# Patient Record
Sex: MALE | Race: WHITE | NOT HISPANIC OR LATINO | Employment: OTHER | URBAN - METROPOLITAN AREA
[De-identification: names, ages, dates, MRNs, and addresses within clinical notes are randomized per-mention and may not be internally consistent; named-entity substitution may affect disease eponyms.]

---

## 2017-08-25 ENCOUNTER — GENERIC CONVERSION - ENCOUNTER (OUTPATIENT)
Dept: OTHER | Facility: OTHER | Age: 77
End: 2017-08-25

## 2017-09-19 DIAGNOSIS — C61 MALIGNANT NEOPLASM OF PROSTATE (HCC): ICD-10-CM

## 2017-09-21 ENCOUNTER — ALLSCRIPTS OFFICE VISIT (OUTPATIENT)
Dept: OTHER | Facility: OTHER | Age: 77
End: 2017-09-21

## 2017-09-21 LAB
BILIRUB UR QL STRIP: NORMAL
CLARITY UR: NORMAL
COLOR UR: YELLOW
GLUCOSE (HISTORICAL): NORMAL
HGB UR QL STRIP.AUTO: NORMAL
KETONES UR STRIP-MCNC: NORMAL MG/DL
LEUKOCYTE ESTERASE UR QL STRIP: NORMAL
NITRITE UR QL STRIP: NORMAL
PH UR STRIP.AUTO: 6 [PH]
PROT UR STRIP-MCNC: NORMAL MG/DL
SP GR UR STRIP.AUTO: 1.02
UROBILINOGEN UR QL STRIP.AUTO: 0.2

## 2017-10-30 ENCOUNTER — ANESTHESIA EVENT (OUTPATIENT)
Dept: PERIOP | Facility: HOSPITAL | Age: 77
DRG: 470 | End: 2017-10-30
Payer: MEDICARE

## 2017-10-30 RX ORDER — SODIUM CHLORIDE 9 MG/ML
125 INJECTION, SOLUTION INTRAVENOUS CONTINUOUS
Status: CANCELLED | OUTPATIENT
Start: 2017-10-30

## 2017-10-31 ENCOUNTER — HOSPITAL ENCOUNTER (OUTPATIENT)
Dept: RADIOLOGY | Facility: HOSPITAL | Age: 77
Discharge: HOME | End: 2017-10-31
Attending: ORTHOPAEDIC SURGERY
Payer: MEDICARE

## 2017-10-31 ENCOUNTER — APPOINTMENT (OUTPATIENT)
Dept: PREADMISSION TESTING | Facility: HOSPITAL | Age: 77
End: 2017-10-31
Payer: MEDICARE

## 2017-10-31 ENCOUNTER — HOSPITAL ENCOUNTER (OUTPATIENT)
Dept: NON INVASIVE DIAGNOSTICS | Facility: HOSPITAL | Age: 77
Discharge: HOME | End: 2017-10-31
Attending: ORTHOPAEDIC SURGERY
Payer: MEDICARE

## 2017-10-31 ENCOUNTER — TRANSCRIBE ORDERS (OUTPATIENT)
Dept: ADMINISTRATIVE | Facility: HOSPITAL | Age: 77
End: 2017-10-31

## 2017-10-31 ENCOUNTER — APPOINTMENT (OUTPATIENT)
Dept: LAB | Facility: HOSPITAL | Age: 77
End: 2017-10-31
Attending: ORTHOPAEDIC SURGERY
Payer: MEDICARE

## 2017-10-31 VITALS
SYSTOLIC BLOOD PRESSURE: 122 MMHG | RESPIRATION RATE: 16 BRPM | TEMPERATURE: 97.6 F | HEART RATE: 68 BPM | DIASTOLIC BLOOD PRESSURE: 70 MMHG | HEIGHT: 72 IN | BODY MASS INDEX: 26.14 KG/M2 | WEIGHT: 193 LBS

## 2017-10-31 DIAGNOSIS — Z01.818 PREOP EXAMINATION: ICD-10-CM

## 2017-10-31 DIAGNOSIS — M16.11 PRIMARY OSTEOARTHRITIS OF RIGHT HIP: ICD-10-CM

## 2017-10-31 DIAGNOSIS — Z01.818 PREOP EXAMINATION: Primary | ICD-10-CM

## 2017-10-31 LAB
ALBUMIN SERPL BCP-MCNC: 3.8 G/DL (ref 3.5–5)
ALP SERPL-CCNC: 100 U/L (ref 46–116)
ALT SERPL W P-5'-P-CCNC: 23 U/L (ref 12–78)
ANION GAP SERPL CALCULATED.3IONS-SCNC: 6 MMOL/L (ref 4–13)
AST SERPL W P-5'-P-CCNC: 17 U/L (ref 5–45)
ATRIAL RATE: 79 BPM
BASOPHILS # BLD AUTO: 0.02 THOUSANDS/ΜL (ref 0–0.1)
BASOPHILS NFR BLD AUTO: 0 % (ref 0–1)
BILIRUB SERPL-MCNC: 0.43 MG/DL (ref 0.2–1)
BILIRUB UR QL STRIP: NEGATIVE
BUN SERPL-MCNC: 11 MG/DL (ref 5–25)
CALCIUM SERPL-MCNC: 9.5 MG/DL (ref 8.3–10.1)
CHLORIDE SERPL-SCNC: 102 MMOL/L (ref 100–108)
CLARITY UR: CLEAR
CO2 SERPL-SCNC: 31 MMOL/L (ref 21–32)
COLOR UR: YELLOW
CREAT SERPL-MCNC: 0.84 MG/DL (ref 0.6–1.3)
EOSINOPHIL # BLD AUTO: 0.23 THOUSAND/ΜL (ref 0–0.61)
EOSINOPHIL NFR BLD AUTO: 4 % (ref 0–6)
ERYTHROCYTE [DISTWIDTH] IN BLOOD BY AUTOMATED COUNT: 13.1 % (ref 11.6–15.1)
GFR SERPL CREATININE-BSD FRML MDRD: 85 ML/MIN/1.73SQ M
GLUCOSE SERPL-MCNC: 92 MG/DL (ref 65–140)
GLUCOSE UR STRIP-MCNC: NEGATIVE MG/DL
HCT VFR BLD AUTO: 36.8 % (ref 36.5–49.3)
HGB BLD-MCNC: 12.4 G/DL (ref 12–17)
HGB UR QL STRIP.AUTO: NEGATIVE
INR PPP: 1.25 (ref 0.86–1.16)
KETONES UR STRIP-MCNC: NEGATIVE MG/DL
LEUKOCYTE ESTERASE UR QL STRIP: NEGATIVE
LYMPHOCYTES # BLD AUTO: 1.02 THOUSANDS/ΜL (ref 0.6–4.47)
LYMPHOCYTES NFR BLD AUTO: 17 % (ref 14–44)
MCH RBC QN AUTO: 30.8 PG (ref 26.8–34.3)
MCHC RBC AUTO-ENTMCNC: 33.7 G/DL (ref 31.4–37.4)
MCV RBC AUTO: 91 FL (ref 82–98)
MONOCYTES # BLD AUTO: 0.57 THOUSAND/ΜL (ref 0.17–1.22)
MONOCYTES NFR BLD AUTO: 10 % (ref 4–12)
NEUTROPHILS # BLD AUTO: 4.11 THOUSANDS/ΜL (ref 1.85–7.62)
NEUTS SEG NFR BLD AUTO: 69 % (ref 43–75)
NITRITE UR QL STRIP: NEGATIVE
NRBC BLD AUTO-RTO: 0 /100 WBCS
PH UR STRIP.AUTO: 6.5 [PH] (ref 4.5–8)
PLATELET # BLD AUTO: 262 THOUSANDS/UL (ref 149–390)
PMV BLD AUTO: 9.8 FL (ref 8.9–12.7)
POTASSIUM SERPL-SCNC: 4.1 MMOL/L (ref 3.5–5.3)
PROT SERPL-MCNC: 7.5 G/DL (ref 6.4–8.2)
PROT UR STRIP-MCNC: NEGATIVE MG/DL
PROTHROMBIN TIME: 15.8 SECONDS (ref 12.1–14.4)
QRS AXIS: 15 DEGREES
QRSD INTERVAL: 78 MS
QT INTERVAL: 406 MS
QTC INTERVAL: 431 MS
RBC # BLD AUTO: 4.03 MILLION/UL (ref 3.88–5.62)
SODIUM SERPL-SCNC: 139 MMOL/L (ref 136–145)
SP GR UR STRIP.AUTO: 1.01 (ref 1–1.03)
T WAVE AXIS: 12 DEGREES
UROBILINOGEN UR QL STRIP.AUTO: 0.2 E.U./DL
VENTRICULAR RATE: 68 BPM
WBC # BLD AUTO: 5.95 THOUSAND/UL (ref 4.31–10.16)

## 2017-10-31 PROCEDURE — 93005 ELECTROCARDIOGRAM TRACING: CPT

## 2017-10-31 PROCEDURE — 36415 COLL VENOUS BLD VENIPUNCTURE: CPT

## 2017-10-31 PROCEDURE — 85025 COMPLETE CBC W/AUTO DIFF WBC: CPT

## 2017-10-31 PROCEDURE — 71020 HB CHEST X-RAY 2VW FRONTAL&LATL: CPT

## 2017-10-31 PROCEDURE — 80053 COMPREHEN METABOLIC PANEL: CPT

## 2017-10-31 PROCEDURE — 85610 PROTHROMBIN TIME: CPT

## 2017-10-31 PROCEDURE — 81003 URINALYSIS AUTO W/O SCOPE: CPT

## 2017-10-31 RX ORDER — DILTIAZEM HYDROCHLORIDE 300 MG/1
300 CAPSULE, EXTENDED RELEASE ORAL DAILY
COMMUNITY
End: 2017-12-01 | Stop reason: HOSPADM

## 2017-10-31 RX ORDER — ATORVASTATIN CALCIUM 20 MG/1
20 TABLET, FILM COATED ORAL 3 TIMES WEEKLY
COMMUNITY

## 2017-10-31 RX ORDER — MULTIVIT-MIN/IRON FUM/FOLIC AC 7.5 MG-4
1 TABLET ORAL DAILY
COMMUNITY
End: 2020-02-22 | Stop reason: HOSPADM

## 2017-10-31 NOTE — PRE-PROCEDURE INSTRUCTIONS
Pre-Surgery Instructions:   Medication Instructions    apixaban (ELIQUIS) 5 mg Patient was instructed by Physician and understands   atorvastatin (LIPITOR) 20 mg tablet Patient was instructed by Physician and understands   diltiazem (TIAZAC) 300 MG 24 hr capsule Patient was instructed by Physician and understands   Leuprolide Acetate (LUPRON SC) Patient was instructed by Physician and understands   Multiple Vitamins-Minerals (MULTIVITAMIN WITH MINERALS) tablet Patient was instructed by Physician and understands   Omega-3 Fatty Acids (FISH OIL PO) Patient was instructed by Physician and understands   TRAMADOL HCL PO Patient was instructed by Physician and understands  Pt instructed by Sergio to take no meds the morning of surgery  Pt and spouse given/reviewed [facility] preop instructions and he has  chlorhexadine soap   Pt to hold asa/NSAIDS/vitamins/herbal supplements one week before surgery or as per Dr Nazia Hernandez

## 2017-10-31 NOTE — ANESTHESIA PREPROCEDURE EVALUATION
Review of Systems/Medical History  Patient summary reviewed  Chart reviewed      Cardiovascular  Hyperlipidemia, Dysrhythmias (controlled VR), atrial fibrillation,    Pulmonary       GI/Hepatic            Endo/Other  Arthritis     GYN       Hematology    Coagulation disorder (Eliquis--to see Cardiology) currently taking oral anticoagulants,    Musculoskeletal  Negative musculoskeletal ROS        Neurology  Negative neurology ROS      Psychology   Negative psychology ROS            Physical Exam    Airway    Mallampati score: II  TM Distance: >3 FB  Neck ROM: full     Dental   No notable dental hx implants,     Cardiovascular  Rhythm: irregular, Rate: normal, Cardiovascular exam normal    Pulmonary  Pulmonary exam normal Breath sounds clear to auscultation,     Other Findings        Anesthesia Plan  ASA Score- 2       Anesthesia Type- spinal with ASA Monitors  Additional Monitors:   Airway Plan:           Induction- intravenous  Informed Consent- Anesthetic plan and risks discussed with patient and spouse

## 2017-10-31 NOTE — ANESTHESIA PREPROCEDURE EVALUATION
Review of Systems/Medical History  Patient summary reviewed  Chart reviewed      Cardiovascular  Hyperlipidemia, Dysrhythmias (controlled VR), atrial fibrillation,    Pulmonary       GI/Hepatic            Endo/Other  Arthritis     GYN       Hematology    Coagulation disorder (Eliquis--to see Cardiology) currently taking oral anticoagulants,    Musculoskeletal  Negative musculoskeletal ROS        Neurology  Negative neurology ROS      Psychology   Negative psychology ROS            Physical Exam    Airway    Mallampati score: II  TM Distance: >3 FB  Neck ROM: full     Dental   No notable dental hx implants,     Cardiovascular  Rhythm: irregular, Rate: normal, Cardiovascular exam normal    Pulmonary  Pulmonary exam normal Breath sounds clear to auscultation,     Other Findings        Anesthesia Plan  ASA Score- 3       Anesthesia Type- spinal with ASA Monitors  Additional Monitors:   Airway Plan:           Induction- intravenous  Informed Consent- Anesthetic plan and risks discussed with patient  I personally reviewed this patient with the CRNA  Discussed and agreed on the Anesthesia Plan with the CRNA  Jocelynn Amador

## 2017-11-29 ENCOUNTER — APPOINTMENT (OUTPATIENT)
Dept: RADIOLOGY | Facility: HOSPITAL | Age: 77
DRG: 470 | End: 2017-11-29
Payer: MEDICARE

## 2017-11-29 ENCOUNTER — ANESTHESIA (OUTPATIENT)
Dept: PERIOP | Facility: HOSPITAL | Age: 77
DRG: 470 | End: 2017-11-29
Payer: MEDICARE

## 2017-11-29 ENCOUNTER — HOSPITAL ENCOUNTER (INPATIENT)
Facility: HOSPITAL | Age: 77
LOS: 2 days | Discharge: HOME HEALTH | DRG: 470 | End: 2017-12-01
Attending: ORTHOPAEDIC SURGERY | Admitting: ORTHOPAEDIC SURGERY
Payer: MEDICARE

## 2017-11-29 ENCOUNTER — APPOINTMENT (INPATIENT)
Dept: RADIOLOGY | Facility: HOSPITAL | Age: 77
DRG: 470 | End: 2017-11-29
Payer: MEDICARE

## 2017-11-29 DIAGNOSIS — I48.91 ATRIAL FIBRILLATION AND FLUTTER (HCC): ICD-10-CM

## 2017-11-29 DIAGNOSIS — M16.11 PRIMARY OSTEOARTHRITIS OF RIGHT HIP: ICD-10-CM

## 2017-11-29 DIAGNOSIS — I49.5 TACHYCARDIA-BRADYCARDIA (HCC): Primary | ICD-10-CM

## 2017-11-29 DIAGNOSIS — I48.92 ATRIAL FIBRILLATION AND FLUTTER (HCC): ICD-10-CM

## 2017-11-29 LAB
ABO GROUP BLD: NORMAL
BLD GP AB SCN SERPL QL: NEGATIVE
INR PPP: 1.07 (ref 0.86–1.16)
PROTHROMBIN TIME: 13.9 SECONDS (ref 12.1–14.4)
RH BLD: POSITIVE
SPECIMEN EXPIRATION DATE: NORMAL

## 2017-11-29 PROCEDURE — 85610 PROTHROMBIN TIME: CPT | Performed by: ORTHOPAEDIC SURGERY

## 2017-11-29 PROCEDURE — C1713 ANCHOR/SCREW BN/BN,TIS/BN: HCPCS | Performed by: ORTHOPAEDIC SURGERY

## 2017-11-29 PROCEDURE — 86901 BLOOD TYPING SEROLOGIC RH(D): CPT | Performed by: ORTHOPAEDIC SURGERY

## 2017-11-29 PROCEDURE — C1776 JOINT DEVICE (IMPLANTABLE): HCPCS | Performed by: ORTHOPAEDIC SURGERY

## 2017-11-29 PROCEDURE — 94762 N-INVAS EAR/PLS OXIMTRY CONT: CPT

## 2017-11-29 PROCEDURE — 86900 BLOOD TYPING SEROLOGIC ABO: CPT | Performed by: ORTHOPAEDIC SURGERY

## 2017-11-29 PROCEDURE — 86850 RBC ANTIBODY SCREEN: CPT | Performed by: ORTHOPAEDIC SURGERY

## 2017-11-29 PROCEDURE — G8978 MOBILITY CURRENT STATUS: HCPCS

## 2017-11-29 PROCEDURE — 73501 X-RAY EXAM HIP UNI 1 VIEW: CPT

## 2017-11-29 PROCEDURE — G8979 MOBILITY GOAL STATUS: HCPCS

## 2017-11-29 PROCEDURE — 97163 PT EVAL HIGH COMPLEX 45 MIN: CPT

## 2017-11-29 DEVICE — PINNACLE HIP SOLUTIONS ALTRX POLYETHYLENE ACETABULAR LINER NEUTRAL 36MM ID 54MM OD
Type: IMPLANTABLE DEVICE | Site: HIP | Status: FUNCTIONAL
Brand: PINNACLE ALTRX

## 2017-11-29 DEVICE — PINNACLE CANCELLOUS BONE SCREW 6.5MM X 20MM
Type: IMPLANTABLE DEVICE | Site: HIP | Status: FUNCTIONAL
Brand: PINNACLE

## 2017-11-29 DEVICE — PINNACLE POROCOAT ACETABULAR SHELL SECTOR II 54MM OD
Type: IMPLANTABLE DEVICE | Site: HIP | Status: FUNCTIONAL
Brand: PINNACLE POROCOAT

## 2017-11-29 DEVICE — CORAIL HIP SYSTEM CEMENTLESS FEMORAL STEM 12/14 AMT 125 DEGREES KLA SIZE 11 HA COATED HIGH OFFSET COLLAR
Type: IMPLANTABLE DEVICE | Site: HIP | Status: FUNCTIONAL
Brand: CORAIL

## 2017-11-29 DEVICE — BIOLOX DELTA CERAMIC FEMORAL HEAD +5.0 36MM DIA 12/14 TAPER
Type: IMPLANTABLE DEVICE | Site: HIP | Status: FUNCTIONAL
Brand: BIOLOX DELTA

## 2017-11-29 RX ORDER — PANTOPRAZOLE SODIUM 40 MG/1
40 TABLET, DELAYED RELEASE ORAL
Status: DISCONTINUED | OUTPATIENT
Start: 2017-11-29 | End: 2017-12-01 | Stop reason: HOSPADM

## 2017-11-29 RX ORDER — FENTANYL CITRATE/PF 50 MCG/ML
50 SYRINGE (ML) INJECTION
Status: DISCONTINUED | OUTPATIENT
Start: 2017-11-29 | End: 2017-11-29 | Stop reason: HOSPADM

## 2017-11-29 RX ORDER — BUPIVACAINE HYDROCHLORIDE 7.5 MG/ML
INJECTION, SOLUTION INTRASPINAL AS NEEDED
Status: DISCONTINUED | OUTPATIENT
Start: 2017-11-29 | End: 2017-11-29 | Stop reason: SURG

## 2017-11-29 RX ORDER — MEPERIDINE HYDROCHLORIDE 50 MG/ML
12.5 INJECTION INTRAMUSCULAR; INTRAVENOUS; SUBCUTANEOUS ONCE AS NEEDED
Status: DISCONTINUED | OUTPATIENT
Start: 2017-11-29 | End: 2017-11-29 | Stop reason: HOSPADM

## 2017-11-29 RX ORDER — MIDAZOLAM HYDROCHLORIDE 1 MG/ML
INJECTION INTRAMUSCULAR; INTRAVENOUS AS NEEDED
Status: DISCONTINUED | OUTPATIENT
Start: 2017-11-29 | End: 2017-11-29 | Stop reason: SURG

## 2017-11-29 RX ORDER — ACETAMINOPHEN 325 MG/1
650 TABLET ORAL EVERY 6 HOURS PRN
Status: DISCONTINUED | OUTPATIENT
Start: 2017-11-29 | End: 2017-12-01 | Stop reason: HOSPADM

## 2017-11-29 RX ORDER — KETOROLAC TROMETHAMINE 30 MG/ML
15 INJECTION, SOLUTION INTRAMUSCULAR; INTRAVENOUS EVERY 6 HOURS PRN
Status: DISPENSED | OUTPATIENT
Start: 2017-11-29 | End: 2017-11-30

## 2017-11-29 RX ORDER — ONDANSETRON 2 MG/ML
4 INJECTION INTRAMUSCULAR; INTRAVENOUS EVERY 6 HOURS PRN
Status: DISCONTINUED | OUTPATIENT
Start: 2017-11-30 | End: 2017-12-01

## 2017-11-29 RX ORDER — ONDANSETRON 2 MG/ML
4 INJECTION INTRAMUSCULAR; INTRAVENOUS EVERY 4 HOURS PRN
Status: ACTIVE | OUTPATIENT
Start: 2017-11-29 | End: 2017-11-30

## 2017-11-29 RX ORDER — CELECOXIB 200 MG/1
200 CAPSULE ORAL DAILY
Status: DISCONTINUED | OUTPATIENT
Start: 2017-11-29 | End: 2017-12-01 | Stop reason: HOSPADM

## 2017-11-29 RX ORDER — SODIUM CHLORIDE, SODIUM LACTATE, POTASSIUM CHLORIDE, CALCIUM CHLORIDE 600; 310; 30; 20 MG/100ML; MG/100ML; MG/100ML; MG/100ML
75 INJECTION, SOLUTION INTRAVENOUS CONTINUOUS
Status: DISCONTINUED | OUTPATIENT
Start: 2017-11-29 | End: 2017-12-01

## 2017-11-29 RX ORDER — VANCOMYCIN HYDROCHLORIDE 1 G/200ML
1000 INJECTION, SOLUTION INTRAVENOUS ONCE
Status: COMPLETED | OUTPATIENT
Start: 2017-11-29 | End: 2017-11-29

## 2017-11-29 RX ORDER — DEXAMETHASONE SODIUM PHOSPHATE 4 MG/ML
4 INJECTION, SOLUTION INTRA-ARTICULAR; INTRALESIONAL; INTRAMUSCULAR; INTRAVENOUS; SOFT TISSUE ONCE AS NEEDED
Status: ACTIVE | OUTPATIENT
Start: 2017-11-29 | End: 2017-11-30

## 2017-11-29 RX ORDER — SODIUM CHLORIDE 9 MG/ML
125 INJECTION, SOLUTION INTRAVENOUS CONTINUOUS
Status: DISCONTINUED | OUTPATIENT
Start: 2017-11-29 | End: 2017-11-30

## 2017-11-29 RX ORDER — PROPOFOL 10 MG/ML
INJECTION, EMULSION INTRAVENOUS CONTINUOUS PRN
Status: DISCONTINUED | OUTPATIENT
Start: 2017-11-29 | End: 2017-11-29 | Stop reason: SURG

## 2017-11-29 RX ORDER — NALBUPHINE HCL 10 MG/ML
5 AMPUL (ML) INJECTION
Status: ACTIVE | OUTPATIENT
Start: 2017-11-29 | End: 2017-11-30

## 2017-11-29 RX ORDER — ONDANSETRON 2 MG/ML
4 INJECTION INTRAMUSCULAR; INTRAVENOUS ONCE AS NEEDED
Status: DISCONTINUED | OUTPATIENT
Start: 2017-11-29 | End: 2017-11-29 | Stop reason: HOSPADM

## 2017-11-29 RX ORDER — DILTIAZEM HYDROCHLORIDE 300 MG/1
300 CAPSULE, COATED, EXTENDED RELEASE ORAL DAILY
Status: DISCONTINUED | OUTPATIENT
Start: 2017-11-29 | End: 2017-11-30

## 2017-11-29 RX ORDER — METHOCARBAMOL 500 MG/1
500 TABLET, FILM COATED ORAL EVERY 6 HOURS PRN
Status: DISCONTINUED | OUTPATIENT
Start: 2017-11-29 | End: 2017-12-01 | Stop reason: HOSPADM

## 2017-11-29 RX ORDER — MAGNESIUM HYDROXIDE 1200 MG/15ML
LIQUID ORAL AS NEEDED
Status: DISCONTINUED | OUTPATIENT
Start: 2017-11-29 | End: 2017-11-29 | Stop reason: HOSPADM

## 2017-11-29 RX ORDER — DIPHENHYDRAMINE HYDROCHLORIDE 50 MG/ML
25 INJECTION INTRAMUSCULAR; INTRAVENOUS EVERY 6 HOURS PRN
Status: DISCONTINUED | OUTPATIENT
Start: 2017-11-29 | End: 2017-11-30

## 2017-11-29 RX ORDER — METOCLOPRAMIDE HYDROCHLORIDE 5 MG/ML
5 INJECTION INTRAMUSCULAR; INTRAVENOUS EVERY 6 HOURS PRN
Status: DISCONTINUED | OUTPATIENT
Start: 2017-11-29 | End: 2017-11-30

## 2017-11-29 RX ORDER — OXYCODONE HYDROCHLORIDE 5 MG/1
5 TABLET ORAL EVERY 4 HOURS PRN
Status: DISCONTINUED | OUTPATIENT
Start: 2017-11-30 | End: 2017-12-01 | Stop reason: HOSPADM

## 2017-11-29 RX ORDER — OXYCODONE HYDROCHLORIDE 10 MG/1
10 TABLET ORAL EVERY 4 HOURS PRN
Status: DISCONTINUED | OUTPATIENT
Start: 2017-11-30 | End: 2017-12-01 | Stop reason: HOSPADM

## 2017-11-29 RX ORDER — SENNOSIDES 8.6 MG
1 TABLET ORAL DAILY
Status: DISCONTINUED | OUTPATIENT
Start: 2017-11-29 | End: 2017-12-01 | Stop reason: HOSPADM

## 2017-11-29 RX ORDER — CALCIUM CARBONATE 200(500)MG
1000 TABLET,CHEWABLE ORAL DAILY PRN
Status: DISCONTINUED | OUTPATIENT
Start: 2017-11-29 | End: 2017-12-01 | Stop reason: HOSPADM

## 2017-11-29 RX ORDER — FENTANYL CITRATE 50 UG/ML
INJECTION, SOLUTION INTRAMUSCULAR; INTRAVENOUS AS NEEDED
Status: DISCONTINUED | OUTPATIENT
Start: 2017-11-29 | End: 2017-11-29 | Stop reason: SURG

## 2017-11-29 RX ORDER — MORPHINE SULFATE 0.5 MG/ML
INJECTION, SOLUTION EPIDURAL; INTRATHECAL; INTRAVENOUS AS NEEDED
Status: DISCONTINUED | OUTPATIENT
Start: 2017-11-29 | End: 2017-11-29 | Stop reason: SURG

## 2017-11-29 RX ORDER — TRANEXAMIC ACID 100 MG/ML
INJECTION, SOLUTION INTRAVENOUS AS NEEDED
Status: DISCONTINUED | OUTPATIENT
Start: 2017-11-29 | End: 2017-11-29 | Stop reason: SURG

## 2017-11-29 RX ORDER — TRAMADOL HYDROCHLORIDE 50 MG/1
50 TABLET ORAL EVERY 6 HOURS PRN
Status: DISCONTINUED | OUTPATIENT
Start: 2017-11-30 | End: 2017-12-01 | Stop reason: HOSPADM

## 2017-11-29 RX ORDER — ATORVASTATIN CALCIUM 20 MG/1
20 TABLET, FILM COATED ORAL
Status: DISCONTINUED | OUTPATIENT
Start: 2017-11-29 | End: 2017-12-01 | Stop reason: HOSPADM

## 2017-11-29 RX ORDER — DILTIAZEM HYDROCHLORIDE 300 MG/1
300 CAPSULE, EXTENDED RELEASE ORAL DAILY
Status: DISCONTINUED | OUTPATIENT
Start: 2017-11-29 | End: 2017-11-29 | Stop reason: SDUPTHER

## 2017-11-29 RX ORDER — ONDANSETRON 2 MG/ML
INJECTION INTRAMUSCULAR; INTRAVENOUS AS NEEDED
Status: DISCONTINUED | OUTPATIENT
Start: 2017-11-29 | End: 2017-11-29 | Stop reason: SURG

## 2017-11-29 RX ADMIN — SODIUM CHLORIDE, SODIUM LACTATE, POTASSIUM CHLORIDE, AND CALCIUM CHLORIDE 1000 ML: .6; .31; .03; .02 INJECTION, SOLUTION INTRAVENOUS at 09:35

## 2017-11-29 RX ADMIN — APIXABAN 2.5 MG: 2.5 TABLET, FILM COATED ORAL at 21:32

## 2017-11-29 RX ADMIN — ONDANSETRON HYDROCHLORIDE 4 MG: 2 INJECTION, SOLUTION INTRAVENOUS at 09:14

## 2017-11-29 RX ADMIN — SODIUM CHLORIDE: 0.9 INJECTION, SOLUTION INTRAVENOUS at 08:51

## 2017-11-29 RX ADMIN — MIDAZOLAM HYDROCHLORIDE 2 MG: 1 INJECTION, SOLUTION INTRAMUSCULAR; INTRAVENOUS at 07:28

## 2017-11-29 RX ADMIN — SODIUM CHLORIDE 125 ML/HR: 0.9 INJECTION, SOLUTION INTRAVENOUS at 06:47

## 2017-11-29 RX ADMIN — VANCOMYCIN HYDROCHLORIDE 1000 MG: 1 INJECTION, SOLUTION INTRAVENOUS at 07:48

## 2017-11-29 RX ADMIN — TRANEXAMIC ACID 1000 MG: 100 INJECTION, SOLUTION INTRAVENOUS at 07:56

## 2017-11-29 RX ADMIN — ATORVASTATIN CALCIUM 20 MG: 20 TABLET, FILM COATED ORAL at 15:28

## 2017-11-29 RX ADMIN — MORPHINE SULFATE 0.15 MG: 0.5 INJECTION, SOLUTION EPIDURAL; INTRATHECAL; INTRAVENOUS at 07:37

## 2017-11-29 RX ADMIN — PROPOFOL 100 MCG/KG/MIN: 10 INJECTION, EMULSION INTRAVENOUS at 07:38

## 2017-11-29 RX ADMIN — BUPIVACAINE HYDROCHLORIDE IN DEXTROSE 1.8 ML: 7.5 INJECTION, SOLUTION SUBARACHNOID at 07:37

## 2017-11-29 RX ADMIN — DILTIAZEM HYDROCHLORIDE 300 MG: 300 CAPSULE, COATED, EXTENDED RELEASE ORAL at 19:09

## 2017-11-29 RX ADMIN — CEFAZOLIN SODIUM 2000 MG: 2 SOLUTION INTRAVENOUS at 07:28

## 2017-11-29 RX ADMIN — SODIUM CHLORIDE, SODIUM LACTATE, POTASSIUM CHLORIDE, AND CALCIUM CHLORIDE 125 ML/HR: 600; 310; 30; 20 INJECTION, SOLUTION INTRAVENOUS at 17:47

## 2017-11-29 RX ADMIN — SODIUM CHLORIDE, SODIUM LACTATE, POTASSIUM CHLORIDE, AND CALCIUM CHLORIDE 125 ML/HR: 600; 310; 30; 20 INJECTION, SOLUTION INTRAVENOUS at 10:28

## 2017-11-29 RX ADMIN — CEFAZOLIN SODIUM 1000 MG: 1 SOLUTION INTRAVENOUS at 15:28

## 2017-11-29 RX ADMIN — FENTANYL CITRATE 100 MCG: 50 INJECTION INTRAMUSCULAR; INTRAVENOUS at 07:32

## 2017-11-29 NOTE — DISCHARGE INSTRUCTIONS
[facility]   Post Operative Joint Replacement Instructions   Dr Tino Morris Office [address] [phone]     Remove dressing post op day 7  Home Health care will remove the staples/sutures post op day 14  MEDICATIONS     *  You will be asked to take your Eliquis at 2 5 mg twice daily for a total of 5 days post operatively and then resume your home dose of 5 mg twice daily beginning the 6th day  * Iron: Continue the iron supplement twice daily  If you experience nausea or constipation with the medicine discontinue its use  Contact us if the nausea persists  * Pain Medications: Your prescriptions may run out before you return for your next visit  Please give us two regular office day's notice before you run out, to prevent any problems of not having pain medicine  Be advised that prescriptions for Percocet and OxyContin cannot be phoned to your pharmacy  They will need to be picked up at our office  Please refrain from using alcohol with your pain medicines  Percocet contains 325 mg of Tylenol  You should not exceed 3000 mg of Tylenol in a day  Please be careful to not overdose the Tylenol  * Herbal Medicines: Please hold all these preparations until after your first post-operative visit  Unless specifically directed otherwise  ACTIVITY   * Your weight bearing status is: as tolerated     * If you have been furnished with an assistive device  Please feel free to try to wean from using it under the supervision of your therapist      * You may participate in activity as tolerated  It is likely that you will tire more easily for a time after surgery  Please rest when you need it to help your healing process  * Stairs are not restricted for you  Please climb stairs as instructed by your physical therapist      * For hip and knee replacement patients please elevate your leg or legs while resting  This is important to prevent lower leg swelling       * When you are back at home you will likely have physical therapy three times a week  On the days you do not have formal therapy please perform the home exercises given to you  * If you had a hip replacement, please follow the safety precautions given to you by the nurse or therapist taking care of you  * Immediately following the surgery you are not permitted to drive until cleared by your surgeon  This typically does not happen until your first visit after surgery  * Knee replacement patients may be passengers in a vehicle following their discharge from the hospital      * Hip replacement patients are not allowed in a vehicle, except for your trip home and your first follow up visit  Please follow these guidelines unless specifically instructed to start outpatient therapy at an earlier time  * Please do not perform lifting tasks or strenuous domestic activities  * If you currently are employed, you are off work until cleared by your surgeon  Everyone's ability to return to work is determined by his or her abilities  Your return to work may take a few weeks to a few months  * Sexual activities are permitted as tolerated  NORMAL FINDINGS   * Numbness along the incision     * Mechanical click of a knee replacement  * Your incision area will feel warm  WOUND CARE   * It is OK to shower once there is no spotting or drainage for a full 24 hours  Please do not submerge the incision into a pool, bath or hot tub until after your 1st post-operative visit  * Please do not disturb your staples, sutures, or the scabs  It promotes better healing and smaller scars  * Cover the incision with gauze dressing until there is no further drainage  * You may leave the incision open to the air when there is no discharge left on the gauze at changing time  Once this occurs you may shower  * Please be generous with the use of ice  It is a very good remedy   Apply ice for only twenty minutes at a time  You may use it every hour  It is recommended to ice before and after anticipated activities, which includes exercise and physical therapy  * Wear your knee-high pressure stockings every day  They may be removed for sleep at night  Continue to wear them until you are seen for your first follow up  * For knee and hip replacements; your staples will be removed about 14 days after your surgery date  Home nurses and physical therapists typically remove them  If they are unable to, please call our office to have us do it for you  FOLLOW UP   * You should have a scheduled visit with your surgeon scheduled for three to four weeks after surgery  If you do not remember your appointment date and time, or if you are sure you do not have one, please call to set one up  * Please inform the office if you experience one of the following:    - Fever that is not responding to Tylenol and is above 101 degrees    - Redness of the skin that is increasing in area    - Your incision is soaking the dressings with drainage or blood    - You're unable to bear weight on your operative leg or legs

## 2017-11-29 NOTE — ANESTHESIA PROCEDURE NOTES
Spinal Block    Patient location during procedure: OR  Start time: 11/29/2017 7:37 AM  End time: 11/29/2017 7:40 AM  Reason for block: procedure for pain, at surgeon's request, post-op pain management and primary anesthetic  Staffing  Anesthesiologist: Maisha Harry  Performed: anesthesiologist   Preanesthetic Checklist  Completed: patient identified, site marked, surgical consent, pre-op evaluation, timeout performed, IV checked, risks and benefits discussed and monitors and equipment checked  Spinal Block  Patient position: sitting  Prep: Betadine  Patient monitoring: cardiac monitor and frequent blood pressure checks  Approach: midline  Location: L3-4  Injection technique: single-shot  Needle  Needle type: pencil-tip   Needle gauge: 25 G  Needle length: 10 cm  Assessment  Sensory level: [id number] Assessment:  negative aspiration for heme, no paresthesia on injection and positive aspiration for clear CSF    Post-procedure:  adhesive bandage applied, pressure dressing applied, secured with tape, site cleaned and sterile dressing applied

## 2017-11-29 NOTE — PLAN OF CARE
Problem: PHYSICAL THERAPY ADULT  Goal: Performs mobility at highest level of function for planned discharge setting  See evaluation for individualized goals  Treatment/Interventions: Functional transfer training, LE strengthening/ROM, Elevations, Therapeutic exercise, Endurance training, Patient/family training, Bed mobility, Gait training, Spoke to nursing, Family  Equipment Recommended: Steph Odonnell, Other (Comment) ([facility])       See flowsheet documentation for full assessment, interventions and recommendations  Outcome: Progressing  Prognosis: Good  Problem List: Decreased strength, Decreased range of motion, Decreased endurance, Impaired balance, Decreased mobility, Pain  Assessment: Pt  77 y o male s/p R THR (anterior) 2* to severe DJD  Pt referred to PT for mobility assessment & D/C planning  PTA, pt reports being I w/o AD  On eval, pt demonstrate dec mobility, balance, endurance & amb 2* to RLE weakness  Pt require minAx1 for most functional mobility + cues for techniques  Gait deviations as above, slow & antalgic but no gross LOB noted  No dizziness reported t/o session  /67 pre-session; /77 at end of session  Will continue skilled PT to improve function & safety  Pt plans to return home when medically cleared  At this time, will anticipate good progress in PT for safe D/C to home  Will recommend HHPT, RW, BSC & family support at D/C  Pt will need to achieve PT goals prior to D/C for safe return to home  CM to follow  Pt tolerated OOB in chair at end of session w/o issues  Call bell in reach  Nsg staff to continue to mobilized pt (OOB in chair for all meals & ambulate in room/unit) as tolerated to prevent further decline in function  Nsg notified  Barriers to Discharge: None     Recommendation: Home PT, Home with family support     PT - OK to Discharge: No    See flowsheet documentation for full assessment

## 2017-11-30 PROBLEM — M16.11 PRIMARY OSTEOARTHRITIS OF RIGHT HIP: Status: ACTIVE | Noted: 2017-11-30

## 2017-11-30 LAB
ANION GAP SERPL CALCULATED.3IONS-SCNC: 3 MMOL/L (ref 4–13)
ATRIAL RATE: 64 BPM
BUN SERPL-MCNC: 10 MG/DL (ref 5–25)
CALCIUM SERPL-MCNC: 8.2 MG/DL (ref 8.3–10.1)
CHLORIDE SERPL-SCNC: 101 MMOL/L (ref 100–108)
CO2 SERPL-SCNC: 27 MMOL/L (ref 21–32)
CREAT SERPL-MCNC: 0.82 MG/DL (ref 0.6–1.3)
GFR SERPL CREATININE-BSD FRML MDRD: 86 ML/MIN/1.73SQ M
GLUCOSE SERPL-MCNC: 105 MG/DL (ref 65–140)
HCT VFR BLD AUTO: 31.4 % (ref 36.5–49.3)
HGB BLD-MCNC: 10.5 G/DL (ref 12–17)
POTASSIUM SERPL-SCNC: 3.7 MMOL/L (ref 3.5–5.3)
QRS AXIS: 38 DEGREES
QRSD INTERVAL: 80 MS
QT INTERVAL: 434 MS
QTC INTERVAL: 434 MS
SODIUM SERPL-SCNC: 131 MMOL/L (ref 136–145)
T WAVE AXIS: -2 DEGREES
VENTRICULAR RATE: 60 BPM

## 2017-11-30 PROCEDURE — 93005 ELECTROCARDIOGRAM TRACING: CPT | Performed by: INTERNAL MEDICINE

## 2017-11-30 PROCEDURE — 85014 HEMATOCRIT: CPT | Performed by: PHYSICIAN ASSISTANT

## 2017-11-30 PROCEDURE — 97530 THERAPEUTIC ACTIVITIES: CPT

## 2017-11-30 PROCEDURE — 0SR904A REPLACEMENT OF RIGHT HIP JOINT WITH CERAMIC ON POLYETHYLENE SYNTHETIC SUBSTITUTE, UNCEMENTED, OPEN APPROACH: ICD-10-PCS | Performed by: ORTHOPAEDIC SURGERY

## 2017-11-30 PROCEDURE — 80048 BASIC METABOLIC PNL TOTAL CA: CPT | Performed by: PHYSICIAN ASSISTANT

## 2017-11-30 PROCEDURE — 85018 HEMOGLOBIN: CPT | Performed by: PHYSICIAN ASSISTANT

## 2017-11-30 PROCEDURE — 97110 THERAPEUTIC EXERCISES: CPT

## 2017-11-30 PROCEDURE — 97116 GAIT TRAINING THERAPY: CPT

## 2017-11-30 RX ORDER — CELECOXIB 200 MG/1
200 CAPSULE ORAL DAILY
Qty: 60 CAPSULE | Refills: 0 | Status: SHIPPED | OUTPATIENT
Start: 2017-11-30 | End: 2020-01-27

## 2017-11-30 RX ORDER — METHOCARBAMOL 500 MG/1
500 TABLET, FILM COATED ORAL EVERY 6 HOURS PRN
Qty: 40 TABLET | Refills: 0 | Status: SHIPPED | OUTPATIENT
Start: 2017-11-30 | End: 2020-01-27

## 2017-11-30 RX ORDER — DILTIAZEM HYDROCHLORIDE 240 MG/1
240 CAPSULE, COATED, EXTENDED RELEASE ORAL DAILY
Status: DISCONTINUED | OUTPATIENT
Start: 2017-12-01 | End: 2017-12-01

## 2017-11-30 RX ORDER — SENNOSIDES 8.6 MG
1 TABLET ORAL DAILY
Qty: 120 EACH | Refills: 0 | Status: SHIPPED | OUTPATIENT
Start: 2017-11-30 | End: 2020-01-27

## 2017-11-30 RX ORDER — FERROUS SULFATE 325(65) MG
325 TABLET ORAL
Status: DISCONTINUED | OUTPATIENT
Start: 2017-12-01 | End: 2017-12-01 | Stop reason: HOSPADM

## 2017-11-30 RX ORDER — ASCORBIC ACID 500 MG
500 TABLET ORAL DAILY
Status: DISCONTINUED | OUTPATIENT
Start: 2017-11-30 | End: 2017-12-01 | Stop reason: HOSPADM

## 2017-11-30 RX ORDER — OXYCODONE HYDROCHLORIDE 5 MG/1
5-10 TABLET ORAL EVERY 4 HOURS PRN
Qty: 60 TABLET | Refills: 0 | Status: SHIPPED | OUTPATIENT
Start: 2017-11-30 | End: 2017-12-10

## 2017-11-30 RX ADMIN — SENNOSIDES 8.6 MG: 8.6 TABLET, FILM COATED ORAL at 09:11

## 2017-11-30 RX ADMIN — CEFAZOLIN SODIUM 1000 MG: 1 SOLUTION INTRAVENOUS at 01:00

## 2017-11-30 RX ADMIN — APIXABAN 2.5 MG: 2.5 TABLET, FILM COATED ORAL at 17:32

## 2017-11-30 RX ADMIN — APIXABAN 2.5 MG: 2.5 TABLET, FILM COATED ORAL at 09:11

## 2017-11-30 RX ADMIN — KETOROLAC TROMETHAMINE 15 MG: 30 INJECTION, SOLUTION INTRAMUSCULAR at 09:10

## 2017-11-30 RX ADMIN — SODIUM CHLORIDE, SODIUM LACTATE, POTASSIUM CHLORIDE, AND CALCIUM CHLORIDE 75 ML/HR: 600; 310; 30; 20 INJECTION, SOLUTION INTRAVENOUS at 16:27

## 2017-11-30 RX ADMIN — TRAMADOL HYDROCHLORIDE 50 MG: 50 TABLET, FILM COATED ORAL at 21:08

## 2017-11-30 RX ADMIN — CELECOXIB 200 MG: 200 CAPSULE ORAL at 09:11

## 2017-11-30 RX ADMIN — SODIUM CHLORIDE, SODIUM LACTATE, POTASSIUM CHLORIDE, AND CALCIUM CHLORIDE 125 ML/HR: 600; 310; 30; 20 INJECTION, SOLUTION INTRAVENOUS at 01:02

## 2017-11-30 RX ADMIN — KETOROLAC TROMETHAMINE 15 MG: 30 INJECTION, SOLUTION INTRAMUSCULAR at 01:05

## 2017-11-30 RX ADMIN — Medication 1 TABLET: at 09:10

## 2017-11-30 RX ADMIN — OXYCODONE HYDROCHLORIDE AND ACETAMINOPHEN 500 MG: 500 TABLET ORAL at 16:22

## 2017-11-30 RX ADMIN — PANTOPRAZOLE SODIUM 40 MG: 40 TABLET, DELAYED RELEASE ORAL at 06:19

## 2017-11-30 RX ADMIN — DILTIAZEM HYDROCHLORIDE 300 MG: 300 CAPSULE, COATED, EXTENDED RELEASE ORAL at 09:11

## 2017-11-30 RX ADMIN — SODIUM CHLORIDE, SODIUM LACTATE, POTASSIUM CHLORIDE, AND CALCIUM CHLORIDE 125 ML/HR: 600; 310; 30; 20 INJECTION, SOLUTION INTRAVENOUS at 09:10

## 2017-11-30 RX ADMIN — TRAMADOL HYDROCHLORIDE 50 MG: 50 TABLET, FILM COATED ORAL at 12:33

## 2017-11-30 NOTE — PLAN OF CARE

## 2017-11-30 NOTE — CASE MANAGEMENT
Initial Clinical Review    Age/Sex: 69 y o  male    Surgery Date:    11/29/2017    Procedure:    R  THR    Anesthesia:    spinal    Admission Orders: Date/Time/Statement: 11/29/17 @ 0929     Orders Placed This Encounter   Procedures    Inpatient Admission     Standing Status:   Standing     Number of Occurrences:   1     Order Specific Question:   Admitting Physician     Answer:   Roro Culp     Order Specific Question:   Level of Care     Answer:   Med Surg [16]     Order Specific Question:   Estimated length of stay     Answer:   Inpatient Only Surgery       Vital Signs: /74   Pulse 84   Temp 99 6 °F (37 6 °C) (Tympanic)   Resp 18   Ht 6' (1 829 m)   Wt 87 5 kg (193 lb)   SpO2 98%   BMI 26 18 kg/m²     Diet:        Diet Orders            Start     Ordered    11/29/17 1322  Room Service  Once     Question:  Type of Service  Answer:  Room Service-Appropriate    11/29/17 1321    11/29/17 1250  Diet Regular; Regular House  Diet effective now     Question Answer Comment   Diet Type Regular    Regular Regular House    RD to adjust diet per protocol?  Yes        11/29/17 1249          Mobility:    As  brigid    DVT Prophylaxis:    SCD'S    Pain Control:   Pain Medications             celecoxib (CeleBREX) 200 mg capsule Take 1 capsule by mouth daily    methocarbamol (ROBAXIN) 500 mg tablet Take 1 tablet by mouth every 6 (six) hours as needed for muscle spasms    oxyCODONE (ROXICODONE) 5 mg immediate release tablet Take 1-2 tablets by mouth every 4 (four) hours as needed for moderate pain for up to 10 days Max Daily Amount: 60 mg        Reg diet  Neurovascular  Checks q 4 hrs  PT/OT  Cons  IM  IV  toradol PRN   (  x3  11/30 thus far)

## 2017-11-30 NOTE — CONSULTS
Consult - Cardiology   Era Pete 69 y o  male MRN: [id number]  Unit/Bed#: E2 -01 Encounter: [id number]        Reason For Consult:  Variable heart rate and hypotension               Assessment and Plan:     1  Permanent atrial fibrillation:   - overall rate controlled with some variability now improved with resumption of usual AV blocking therapy  - spurious telemetry alarms for heart rates in the 40s but no pauses and overall trend now 50-70 beats per minute  - with low normal heart rate trend and mild hypotension it is reasonable to reduce his Cardizem from 300 to 240 mg once daily as you have  - Eliquis anticoagulation resumed  2  Mild hypotension: This in the setting of administration of Cardizem  mg last evening and again this morning ~~> agree with IVF (will decrease rate) and Rx dose adjustment as above  3   Osteoarthritis-status post right hip replacement:     - Postop day 1:  Stable  Ongoing rehabilitation with potential discharge tomorrow ~~> management per Orthopedic surgery      History Of Present Illness: This gentleman is a 25-year-old whose usual cardiologist is Dr Aparna Combs of the [address] [place] [address] Drive  He has a known history of permanent atrial fibrillation which is typically managed with AV blocking therapy-Cardizem 300 mg along with Eliquis anticoagulation  This gentleman is currently hospitalized having been admitted yesterday, 11/29, for elective right hip replacement  He reportedly had an unremarkable surgery with contemplation of possible discharged home today  Thus far today this patient's systolic blood pressures have been   He had been normotensive yesterday (115-125) indicating that he typically has a low-normal blood pressure  Last evening he received his usual once daily dose of Cardizem CD 300mg and again at 0900 this morning    His telemetry shows controlled atrial fibrillation with 24 hour ventricular rate trend ranging approximately  beats per minute  During the last 12 hours his heart rate has been more consistently in the 50-70 range  There were some R-R intervals triggering the monitor to record ventricular rates in the 40s though there are no pauses, and the overall heart rate trend is above 50 beats per minute  It is because of these things we are asked to evaluate the patient  At the time my visit the patient is sitting out of bed feeling quite well  He denies any recent problems with lightheadedness, subjective orthostasis, chest pain or dyspnea  He has historically been unaware of his heart rate rhythm  He indicates he also has a history of dyslipidemia but no other cardiac problems  He specifically denies: CAD, prior myocardial infarction, valvular heart disease, or CHF  Past Medical History:        Past Medical History:   Diagnosis Date    Arthritis     [facility]rial fibrillation [facility] [address] [facility]     Cancer ([facility] [address] )     prostate    Dental crowns present     History of pleurisy 1974    History of pneumonia     several years ago    History of prostate cancer     History of radiation therapy     final treatment May 2017    [place]     bilat w hearing aids    Hyperlipidemia     Left thigh pain     Pneumonia     Right hip pain     Unsteady gait     Wears glasses     Past Surgical History:   Procedure Laterality Date    CARPAL TUNNEL RELEASE Bilateral     COLONOSCOPY      FOOT SURGERY Right     HEMORRHOID SURGERY      NV TOTAL HIP ARTHROPLASTY Right 11/29/2017    Procedure: ARTHROPLASTY HIP TOTAL ANTERIOR;  Surgeon: Glenn Kidd MD;  Location: [facility];  Service: Orthopedics        Allergy:        No Known Allergies    Medications:       Prior to Admission medications    Medication Sig Start Date End Date Taking?  Authorizing Provider   apixaban (ELIQUIS) 5 mg Take 5 mg by mouth 2 (two) times a day    Historical Provider, MD   atorvastatin (LIPITOR) 20 mg tablet Take 20 mg by mouth 3 (three) times a week    Historical Provider, MD   celecoxib (CeleBREX) 200 mg capsule Take 1 capsule by mouth daily 11/30/17   Geeta Mcdowell MD   diltiazem (TIAZAC) 300 MG 24 hr capsule Take 300 mg by mouth daily    Historical Provider, MD   Leuprolide Acetate (LUPRON SC) Inject under the skin Pt reports receives q 6months at urologist office    Historical Provider, MD   methocarbamol (ROBAXIN) 500 mg tablet Take 1 tablet by mouth every 6 (six) hours as needed for muscle spasms 11/30/17   Geeta Mcdowell MD   Multiple Vitamins-Minerals (MULTIVITAMIN WITH MINERALS) tablet Take 1 tablet by mouth daily    Historical Provider, MD   Omega-3 Fatty Acids (FISH OIL PO) Take 2,000 mg by mouth    Historical Provider, MD   oxyCODONE (ROXICODONE) 5 mg immediate release tablet Take 1-2 tablets by mouth every 4 (four) hours as needed for moderate pain for up to 10 days Max Daily Amount: 60 mg 11/30/17 12/10/17  Geeta Mcdowell MD   senna (SENOKOT) 8 6 mg Take 1 tablet by mouth daily 11/30/17   Geeta Mcdowell MD   TRAMADOL HCL PO Take 30 mg by mouth daily as needed "usually takes at bedtime if needed and 1/2 to 1 tab"    Historical Provider, MD       Family History:     History reviewed  No pertinent family history  Social History:       Social History     Social History    Marital status: /Civil Union     Spouse name: N/A    Number of children: N/A    Years of education: N/A     Social History Main Topics    Smoking status: Former Smoker     Types: Pipe     Quit date: 12/31/1986    Smokeless tobacco: Never Used    Alcohol use Yes      Comment: wine daily with a meal or so    Drug use: No    Sexual activity: Not Asked     Other Topics Concern    None     Social History Narrative    None       ROS:  Symptoms per HPI  Mild chronic lower extremity edema  Decreased hearing    Exam:  General:  Alert, cooperative, comfortable-appearing while seated out of bed  Head: Normocephalic, atraumatic  Eyes:  EOMI  Pupils - equal, round, reactive toaccomodation  No icterus  Normal Conjunctiva  Oropharynx:  Moist without lesion  Neck:  No JVD or bruit  Heart:  Somewhat distant, irregular, controlled rate, no pathologic murmur nor rub  Lungs:  Clear with no rales/rhonchi/wheeze  Abdomen: flat, normal findings: bowel sounds normal, no masses palpable, no organomegaly and soft, non-tender  Lower Limbs:  Mild pitting edema  Pulses[de-identified]  Dorsal pedal pulses 2+ bilaterally  Musculoskeletal: Independent movement of limbs observed, Formal ROM and strength eval not performed  Neurologic:    Oriented to: person , place, situation  Cranial Nerves: grossly intact - vision, smell, taste, and hearing  were not tested  Motor function: grossly normal,symmetric   Sensation: was not tested  DATA:             Weights: Wt Readings from Last 3 Encounters:   11/29/17 87 5 kg (193 lb)   10/31/17 87 5 kg (193 lb)   , Body mass index is 26 18 kg/m²           Lab Studies:               Results from last 7 days  Lab Units 11/30/17  0551   HEMOGLOBIN g/dL 10 5*   HEMATOCRIT % 31 4*   ,   Results from last 7 days  Lab Units 11/30/17  0551   SODIUM mmol/L 131*   POTASSIUM mmol/L 3 7   CHLORIDE mmol/L 101   CO2 mmol/L 27   BUN mg/dL 10   CREATININE mg/dL 0 82   CALCIUM mg/dL 8 2*   GLUCOSE RANDOM mg/dL 105

## 2017-11-30 NOTE — PLAN OF CARE
Problem: PHYSICAL THERAPY ADULT  Goal: Performs mobility at highest level of function for planned discharge setting  See evaluation for individualized goals  Treatment/Interventions: Functional transfer training, LE strengthening/ROM, Elevations, Therapeutic exercise, Endurance training, Patient/family training, Bed mobility, Gait training, Spoke to nursing, Family  Equipment Recommended: Devora Ohara, Other (Comment) ( & [facility])       See flowsheet documentation for full assessment, interventions and recommendations  Outcome: Progressing  Prognosis: Fair  Problem List: Decreased strength, Decreased range of motion, Decreased endurance, Decreased mobility, Impaired balance, Pain  Assessment: Pt  supine in bed upon my arrival  Performance of HEP supine with A required from therapist for completion  Progressed with transfers being able to complete all with standbyA of therapist  BP measured manually at 100/74 seated at EOB, however pt  remained asymptomatic  Progressed with limited amb  trial with use of RW and cueing from therapist for improvement of LE sequencing  Positioned seated in bedside chair at end of treatment session with ice applied  RN present and aware of BP measurements at end of therapy session  Pt  will need to continue to progress with PT goals to ensure a safe d/c home with HHPT and family support as needed when medically stable  Barriers to Discharge: Inaccessible home environment     Recommendation: Home PT, Home with family support     PT - OK to Discharge: No (Continued progression of PT goals )    See flowsheet documentation for full assessment

## 2017-11-30 NOTE — PHYSICAL THERAPY NOTE
Physical Therapy Progress Note     11/30/17 0908   Pain Assessment   Pain Assessment 0-10   Pain Score 3   Pain Type Surgical pain   Pain Location Hip   Pain Orientation Right   Hospital Pain Intervention(s) Cold applied; Ambulation/increased activity   Response to Interventions Tolerated  Restrictions/Precautions   Weight Bearing Precautions Per Order Yes   RLE Weight Bearing Per Order WBAT   Other Precautions Fall Risk;Pain   General   Chart Reviewed Yes   Response to Previous Treatment Patient with no complaints from previous session  Family/Caregiver Present No   Subjective   Subjective Willing to participate in therapy this AM    Bed Mobility   Supine to Sit 5  Supervision   Additional items Assist x 1;HOB elevated; Bedrails; Increased time required;Verbal cues;LE management   Transfers   Sit to Stand 5  Supervision   Additional items Assist x 1;Bedrails; Increased time required;Verbal cues   Stand to Sit 5  Supervision   Additional items Assist x 1; Armrests; Increased time required;Verbal cues   Ambulation/Elevation   Gait pattern Decreased foot clearance;Decreased R stance; Short stride; Step to;Excessively slow; Antalgic   Gait Assistance 5  Supervision   Additional items Assist x 1;Verbal cues; Tactile cues   Assistive Device Rolling walker   Distance 15'   Balance   Static Sitting Good   Dynamic Sitting Fair +   Static Standing Fair   Dynamic Standing Fair -   Ambulatory Fair   Endurance Deficit   Endurance Deficit Yes   Endurance Deficit Description medical   Activity Tolerance   Activity Tolerance Treatment limited secondary to medical complications (Comment)   Nurse Made Aware Yes   Exercises   THR Supine;10 reps;AAROM; Bilateral   Assessment   Prognosis Fair   Problem List Decreased strength;Decreased range of motion;Decreased endurance;Decreased mobility; Impaired balance;Pain   Assessment Pt  supine in bed upon my arrival  Performance of HEP supine with A required from therapist for completion  Progressed with transfers being able to complete all with standbyA of therapist  BP measured manually at 100/74 seated at EOB, however pt  remained asymptomatic  Progressed with limited amb  trial with use of RW and cueing from therapist for improvement of LE sequencing  Positioned seated in bedside chair at end of treatment session with ice applied  RN present and aware of BP measurements at end of therapy session  Pt  will need to continue to progress with PT goals to ensure a safe d/c home with HHPT and family support as needed when medically stable  Barriers to Discharge Inaccessible home environment   Goals   Patient Goals To go home  STG Expiration Date 12/04/17   Treatment Day 1   Plan   Treatment/Interventions Functional transfer training;LE strengthening/ROM; Therapeutic exercise; Endurance training;Bed mobility;Gait training;Spoke to nursing;Spoke to case management   Progress Slow progress, medical status limitations   PT Frequency Twice a day   Recommendation   Recommendation Home PT; Home with family support   Equipment Recommended [facility] [place]; Other (Comment)  (RW, BSC)   PT - OK to Discharge No  (Continued progression of PT goals )     Lonia Raw, PTA

## 2017-11-30 NOTE — PHYSICAL THERAPY NOTE
Physical Therapy Progress Note        11/30/17 1438   Pain Assessment   Pain Assessment 0-10   Pain Score 3   Pain Type Surgical pain   Pain Location Hip   Pain Orientation Right   Hospital Pain Intervention(s) Ambulation/increased activity;Cold applied   Response to Interventions Tolerated  Restrictions/Precautions   Weight Bearing Precautions Per Order Yes   RLE Weight Bearing Per Order WBAT   Other Precautions Fall Risk;Pain   General   Chart Reviewed Yes   Response to Previous Treatment Patient with no complaints from previous session  Family/Caregiver Present Yes   Subjective   Subjective Willing to participate in therapy this PM    Bed Mobility   Supine to Sit 5  Supervision   Additional items Assist x 1;HOB elevated; Bedrails; Increased time required;Verbal cues;LE management   Transfers   Sit to Stand 5  Supervision   Additional items Assist x 1;Bedrails; Increased time required;Verbal cues   Stand to Sit 5  Supervision   Additional items Assist x 1; Armrests; Increased time required;Verbal cues   Toilet transfer 5  Supervision   Additional items Assist x 1; Armrests; Increased time required;Verbal cues;Raised toilet seat   Ambulation/Elevation   Gait pattern Decreased foot clearance;Decreased R stance; Short stride; Step to;Excessively slow; Antalgic   Gait Assistance 5  Supervision   Additional items Assist x 1;Verbal cues; Tactile cues   Assistive Device Rolling walker   Distance 100'   Balance   Static Sitting Good   Dynamic Sitting Fair +   Static Standing Fair   Dynamic Standing Fair   Ambulatory Fair   Endurance Deficit   Endurance Deficit No   Activity Tolerance   Activity Tolerance Patient tolerated treatment well   Nurse Made Aware Yes   Exercises   THR Supine;Sitting;10 reps;AAROM; Bilateral   Assessment   Prognosis Fair   Problem List Decreased strength;Decreased range of motion; Impaired balance;Decreased endurance;Decreased mobility;Pain   Assessment Pt  supine in bed upon my arrival  Performance of HEP supine in bed  Pt  able to complete all transfers practicing proper technique with no noted LOB  Requested to use br able to complete pericare  BP taken standing at 99/64  Pt  continues to remain asymptomatic with no reports  Progressed with increased amb  trial with use of RW and standbyA of therapist with cueing provided for improvement of LE sequencing  Upon return to room repositioned seated in bedside chair with ice applied at end of treatment session  Pt  will continue to progress with PT goals to ensure a safe d/c home with HHPT and family support as needed when medically stable for d/c     Barriers to Discharge Inaccessible home environment   Goals   Patient Goals To go home  STG Expiration Date 12/04/17   Treatment Day 2   Plan   Treatment/Interventions Functional transfer training;LE strengthening/ROM; Therapeutic exercise; Endurance training;Bed mobility;Gait training;Spoke to nursing;Spoke to case management; Family   Progress Progressing toward goals   PT Frequency Twice a day   Recommendation   Recommendation Home PT; Home with family support   PT - OK to Discharge No  (Continued progression of PT goals )     Sam Cooper, PTA

## 2017-11-30 NOTE — OP NOTE
OPERATIVE REPORT  PATIENT NAME: Suhail Bruner    :  1940  MRN: [id number]  Pt Location: [facility] OR ROOM 02    Operative Note    Suhail Bruner  2017    Pre-op Diagnosis:   Right Hip Osteoarthritis    Post-op Diagnosis:   Right Hip Osteoarthritis    Procedure:  Right Hip Replacement    Surgeon:  Alyssa Kern MD    Assistants:  BIANCA Bianchi CFA       Anesthesia:  Spinal    Estimated Blood Loss:  250cc    Components:     Implant Name Type Inv  Item Serial No   Lot No  LRB No  Used   SHELL ACETABULAR 54MM POROUS SOLID LCK RING PINNACLE - [id number]  SHELL ACETABULAR 54MM POROUS SOLID LCK RING PINNACLE  DEPUY [id number] Right 1   SCREW LACEY 6 5 X 20MM SLF TAP PINNACLE - [id number]  SCREW LACEY 6 5 X 20MM SLF TAP PINNACLE  DEPUY [id number] Right 1   LINER ACTB ULT LNK PE 36 X 54MM 0DEG NEUTRAL ALTRX - [id number]  LINER ACTB ULT LNK PE 36 X 54MM 0DEG NEUTRAL ALTRX  DEPUY [id number] Right 1   CORAIL2 LAT COXA VARA SIZE 11 DEPUY [id number] - [id number]  CORAIL2 LAT COXA VARA SIZE 11 DEPUY [id number]  DEPUY [id number] Right 1   HEAD FEMORAL CMNTLS 12/14 TPR 36MM +5MM BIOLOX DELTA ARTICULEZE - [id number]   HEAD FEMORAL CMNTLS 12/14 TPR 36MM +5MM BIOLOX DELTA ARTICULEZE   DEPUY [id number] Right 1       APPROACH:   Direct anterior  INDICATION: Suhail Bruner is a 69 y o  male with advanced osteoarthritis of the Right hip, which was refractory to nonoperative therapy  He understood the risks and benefits of hip replacement and wished to proceed  DESCRIPTION OF PROCEDURE: On 2017 the patient was brought to the holding area  The identity and surgical site were confirmed by him and the surgeon  Perioperative intravenous antibiotics were given  Anesthesia was administered by the anesthesia team  The leg length examination was performed with him in the supine position after anesthesia  The operative extremity was shorter by 2-3mm   Then the patient was positioned on the HANA table and preoperative fluoroscopic views were obtained  Then the Right extremity was prepped and draped in the usual sterile manner  A direct anterior approach was performed to the hip in the usual manner, exposing the capsule and doing a capsulotomy  A femoral neck osteotomy was made  The head was removed  Acetabulum was exposed and reamed sequentially and eventually underreamed by 1mm for the impaction of the appropriately sized cup with excellent stability to host bone  This was impacted under direct fluoroscopic guidance to make sure we were satisfied with the position  Also, there was good coverage anteriorly to prevent any impingement  One screw was placed in the posterior-superior quadrant  Osteophytes were removed circumferentially from the acetabulum  Then the polyethylene liner was impacted into the cup and it was down all the way around  Attention was then turned to the femur  Appropriate releases were made  The leg was dropped into hyperextension, external rotation  The canal was accessed without difficulty  It was broached to fit the appropriate size stem, which gave excellent stability within the host bone, both axially and rotationally  We used the reamers distally to prevent any potting  We used the calcar planer as well until we were satisfied with the position of the broach relative to the proximal femur  We trialed  We were able to achieve good stability and good restoration of leg lengths and the stem size and neck were chosen as 11KLA (the KLA fit preop anatomy best and allowed stability while not adding unnecessary LL)  Therefore, trial components were removed  The femoral canal was irrigated and the stem was impacted until it reached a firm endpoint, and it had excellent stability within the host bone, both axially and rotationally   We trialed with the different ball options and the 5 ball was chosen based on stabililty, measurement of leg lengths by fluoroscopic views, and the soft tissue tension to be appropriate and not excessively lax or tight  With that, there was excellent stability as the hip was able to come to 120 degrees of external rotation, in neutral, it was able to come to 100 degrees of external rotation, 30 degrees of hyperextension, had a firm endpoint posteriorly and the soft tissue tension was appropriate without being excessively lax or tight  Also, the leg length restoration appeared to be appropriate given the fluoroscopic measurements and the preoperative leg length examination  The 1 5 ball was lax with reduction and dislocation maneuvers and also there was instability in maximal ER, which was not acceptable  Therefore, the 5 ball was chosen and impacted with appropriate force onto a clean trunnion with 3 blows of the mallet  The hip was then reduced  It was irrigated copiously with several liters of pulsed lavage  A periarticular injection was injected for postoperative analgesia  The capsulotomy and the fascia were closed with #1 Vicryl suture  The skin was closed using Vicryl sutures, staples, skin adhesive and a sterile dressing  The patient was then awakened and taken to the recovery room  The assistance of Osvaldo Russell was essential as no qualified resident assistance was available  XRAY REPORT: Postoperative x-rays including an AP view of the Right hip as well as fluoroscopic views of the Right hip taken intraoperatively on 11/29/2017 were checked and revealed a Right hip replacement in good alignment with no fracture or dislocation         Nicole Restrepo MD     Date: 11/30/2017  Time: 6:26 AM

## 2017-11-30 NOTE — DISCHARGE SUMMARY
DISCHARGE SUMMARY      Patient Name: Ethan Pillai  Patient MRN: [id number]  Admitting Provider: Alistair Mariee MD  Discharging Provider: Alistair Mariee MD  Primary Care Physician at Discharge: Gennaro Butcher [phone]  Admission Date: 11/29/2017       Discharge Date: 11/30/17    Admission Diagnosis  Primary osteoarthritis of right hip [M16 11]    Discharge Diagnoses  [facility]  Ethan Pillai was admitted to [facility] on 11/29/2017, with diagnosis of osteoarthritis in his Right hip  On the day of admission, he was brought to surgery, successfully underwent a Right hip replacement  He tolerated the procedure well  Following surgery, he was taken to the recovery room, at which time, there was a consult placed for Dr Fiona Prasad for the patient's medical management  After a short stay in the recovery room, he was transferred to the orthopedic floor at which time, he was resumed on his routine home medications per the hospitalist group  On postop day #1, he was able to start some physical therapy and was able to tolerate it well  At this time, he was in stable condition, showing no sign of deep vein thrombosis or pulmonary embolism  Incision was healing well at the time and showed no signs of infection  DISCHARGE DIAGNOSIS: Primary osteoarthritis of right hip [M16 11]  He is now status post Right total hip replacement, which he underwent during the hospital stay  Medications  See after visit summary for reconciled discharge medications provided to patient and family      Allergies  No Known Allergies    Outpatient Follow-Up  Future Appointments  Date Time Provider Kristina Will   3/2/2018 8:30 AM Nathalia Eddy MD [facility] appts None       Discharge Disposition  home    Operative Procedures Performed  Procedure(s):  ARTHROPLASTY HIP TOTAL ANTERIOR        Chu Valle PA-C    DISCHARGE SUMMARY

## 2017-11-30 NOTE — PROGRESS NOTES
Orthopedic Total Hip Progress Note      Subjective     Status post-Right total hip arthroplasty  Systemic or Specific Complaints: No Complaints    Objective     Vital signs in last 24 hours:  Temp:  [96 9 °F (36 1 °C)-98 8 °F (37 1 °C)] 98 7 °F (37 1 °C)  HR:  [] 98  Resp:  [13-20] 18  BP: ()/(51-81) 102/54    Neurovascular: Capillary refill: Normal  Dorsiflexion: 5/5  Plantarflexion:  5/5   Wound: Dressing:  clean/dry/intact   DVT Exam: No evidence of DVT seen on physical exam      Data Review:  CBC:   Lab Results   Component Value Date    WBC 5 95 10/31/2017    RBC 4 03 10/31/2017    HGB 10 5 (L) 11/30/2017    HCT 31 4 (L) 11/30/2017     10/31/2017       Assessment/Plan     Status post-Right total hip arthroplasty: Doing well postoperatively  Discharge today, Return to Clinic: as scheduled if cleared by therapy and Medicine      Activity: ad yasmani      Simran Potts PA-C    Date: 11/30/2017  Time: 7:08 AM

## 2017-11-30 NOTE — PROGRESS NOTES
Progress Note - Internal Medicine   Susan Kimble 69 y o  male MRN: [id number]  Unit/Bed#: E2 -01 Encounter: [id number]      Impression :    POD #1, status post right total hip replacement by Dr Nazia Hernandez  Advanced end-stage DJD right hip  Chronic atrial fibrillation (patient was off antiarrhythmic drugs on his own preoperatively 5 days by mistake)  Anish Moras secondary to atrial fibrillation and flutter  Hypercholesterolemia  Chronically anticoagulated with Factor Xa inhibitor-Eliquis  Prostate cancer  Previous history of left carpal tunnel surgery 2014  Mild dilutional hyponatremia, sodium 131  Mild acute blood loss anemia  Recommendation:    · Reduced dose of Cardizem to 240 mg daily instead of 300 mg  · Continue telemetry and request 12 lead electrocardiogram  · Patient will require cardiac evaluation due to low blood pressure 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  · Discussed with the wife and she will also arrange earlier appointment with her outpatient cardiologist Dr Alley Lorenzana  · Continue Eliquis for both atrial fibrillation related thromboembolic prevention as well as PE and DVT prevention  · Hold patient's discharge for at least next 24 hours  · Add ferrous sulfate and ascorbic acid and recheck hemoglobin tomorrow morning  Mild dilutional hyponatremia, recheck sodium and serum osmolarity in a [facility] · Discussed with JOHNATHAN Pelletier  Subjective:     Patient seen and examined today  EMR and overnight events reviewed  Patient resting in bed, his wife was at the bedside  He indicates of not having any major problems  Reviewed with the nursing staff and noted that he was having issues with blood pressure  Patient was maintained on telemetry over night and had intermittent episodes of low heart rate dropping into mid 40s and at times following therapy his heart rate increased   Patient resting supine in bed upon my arrival  Progressed with transfers and BP measured manually at PT was 100/74 seated, remained asymptomatic  Progressed with limited amb  trial with use of RW and cueing from therapist for improvement of LE sequencing  Patient normally takes 300 mg of Cardizem which in fact he did not take 5 days prior to his surgery in addition to not taking his Eliquis  He has resumed his medications  Patient is normally seen by Dr Olivia Osman from [address] [place] [address]  Review of Systems     Patient denies any dizziness lightheadedness  Denies any chest pain palpitation denies any nausea vomiting  No bladder bowel complaints  Patient denies any dysuria frequency  Appetite is well maintained  Participating with therapy without any discomfort  No diaphoretic spells no numbness tingling etc     All other systems reviewed and are negative  OBJECTIVE:     Vitals:     HR:  [] 55  Resp:  [16-20] 18  BP: ()/(52-74) 90/52  SpO2:  [97 %-98 %] 97 %  Temp (24hrs), Av 7 °F (37 1 °C), Min:98 1 °F (36 7 °C), Max:99 6 °F (37 6 °C)  Current: Temperature: 99 °F (37 2 °C)    Intake/Output Summary (Last 24 hours) at 17 1431  Last data filed at 17 0918   Gross per 24 hour   Intake          2368 75 ml   Output             3225 ml   Net          -856 25 ml     Body mass index is 26 18 kg/m²  Physical Exam    General Appearance:  Not in distress  Wife at bedside  Tele and IVF noted  Pallor / Icterus/ Cyanosis negative  Oropharynx no thrush  Awake,alert, cooperative  Tongue protrudes in midline  Head:  Normocephalic, atraumatic  No titubations  Eyes:  No eye redness or discharge bilaterally  Neck: Supple, no raised JVP  Back:   Symmetric, no kypho-scoliosis  Lungs:   B/L Clear to auscultation, no wheezing or rhonchi  Normal broncho-alveolar air entry  No pleural rubs  Heart:   Irregular S1S2, A2P2 normal, II/VI murmur or gallop  Abdomen:   Soft, non-tender,no rebound, bowel sounds+  Musculoskeletal: Extremities no cyanosis or edema     Surgical site on the operated right hip has clean dressing  No discharge or drainage  Mild diminution and limited range of motion   Psych: Normal Affect / No hallucinations or delusions  Neurologic:             Skin:  Endocrine:  Vascular: Awake and alert  Mentation intact  Speech is intact  Facial symmetry is maintained  Muscle strength is 5/5 in all muscle groups except on operated right lower limb which is limited due to recent surgery  Light touch and temperature sensation is maintained  No rashes /purpura  No open wounds  No thyromegaly / no lid lag  Homans sign is negative  B/L Calf are supple and non tender         Labs, Imaging, & Other studies:    All pertinent labs and imaging studies were personally reviewed    Results from last 7 days  Lab Units 11/30/17  0551   HEMOGLOBIN g/dL 10 5*       Results from last 7 days  Lab Units 11/30/17  0551   SODIUM mmol/L 131*   POTASSIUM mmol/L 3 7   CHLORIDE mmol/L 101   CO2 mmol/L 27   ANION GAP mmol/L 3*   BUN mg/dL 10   CREATININE mg/dL 0 82   EGFR ml/min/1 73sq m 86   GLUCOSE RANDOM mg/dL 105   CALCIUM mg/dL 8 2*           Current Meds:  Current Facility-Administered Medications   Medication Dose Route Frequency Provider Last Rate Last Dose    acetaminophen (TYLENOL) tablet 650 mg  650 mg Oral Q6H PRN Norman Cr PA-C        apixaban Severo Saulo) tablet 2 5 mg  2 5 mg Oral BID Norman Cr PA-C   2 5 mg at 11/30/17 0911    atorvastatin (LIPITOR) tablet 20 mg  20 mg Oral Once per day on Mon Wed Fri DELMI BautistaC   20 mg at 11/29/17 1528    calcium carbonate (TUMS) chewable tablet 1,000 mg  1,000 mg Oral Daily PRN Norman Cr PA-C        celecoxib (CeleBREX) capsule 200 mg  200 mg Oral Daily Norman Cr PA-C   200 mg at 11/30/17 0911    [START ON 12/1/2017] diltiazem (CARDIZEM CD) 24 hr capsule 240 mg  240 mg Oral Daily Delilah Trujillo MD        HYDROmorphone (DILAUDID) 1 mg/mL injection 0 2 mg  0 2 mg Intravenous Q3H PRN Norman Cr PA-C        lactated ringers infusion  125 mL/hr Intravenous Continuous Lydia Meadows PA-C 125 mL/hr at 11/30/17 0910 125 mL/hr at 11/30/17 0910    methocarbamol (ROBAXIN) tablet 500 mg  500 mg Oral Q6H PRN Lydia Meadows PA-C        multivitamin-minerals (CENTRUM) tablet 1 tablet  1 tablet Oral Daily Lydia Meadows PA-C   1 tablet at 11/30/17 0910    ondansetron (ZOFRAN) injection 4 mg  4 mg Intravenous Q6H PRN Lydia Meadows PA-C        oxyCODONE (ROXICODONE) immediate release tablet 10 mg  10 mg Oral Q4H PRN Lydia Meadows PA-C        oxyCODONE (ROXICODONE) IR tablet 5 mg  5 mg Oral Q4H PRN Lydia Meadows PA-C        pantoprazole (PROTONIX) EC tablet 40 mg  40 mg Oral Early Morning Lydia Meadows PA-C   40 mg at 11/30/17 9290    senna (SENOKOT) tablet 8 6 mg  1 tablet Oral Daily Lydia Meadows PA-C   8 6 mg at 11/30/17 3645    sodium chloride 0 9 % infusion  125 mL/hr Intravenous Continuous Mita Ese    Stopped at 11/29/17 0935    traMADol (ULTRAM) tablet 50 mg  50 mg Oral Q6H PRN Lydia Meadows PA-C   50 mg at 11/30/17 1233     Home Meds:  Prescriptions Prior to Admission   Medication    apixaban (ELIQUIS) 5 mg    atorvastatin (LIPITOR) 20 mg tablet    diltiazem (TIAZAC) 300 MG 24 hr capsule    Leuprolide Acetate (LUPRON SC)    Multiple Vitamins-Minerals (MULTIVITAMIN WITH MINERALS) tablet    Omega-3 Fatty Acids (FISH OIL PO)    TRAMADOL HCL PO       Continuous Infusions:    lactated ringers 125 mL/hr Last Rate: 125 mL/hr (11/30/17 0910)   sodium chloride 125 mL/hr Last Rate: Stopped (11/29/17 0935)       Invasive Devices     Peripheral Intravenous Line            Peripheral IV 11/29/17 Left Forearm 1 day                VTE Pharmacologic Prophylaxis: Eliquis as per Primary Ortho Team   VTE Mechanical Prophylaxis: sequential compression device    Portions of the record may have been created with voice recognition software   Occasional wrong word or "sound a like" substitutions may have occurred due to the inherent limitations of voice recognition software  Read the chart carefully and recognize, using context, where substitutions have occurred

## 2017-12-01 VITALS
WEIGHT: 193 LBS | HEART RATE: 55 BPM | DIASTOLIC BLOOD PRESSURE: 73 MMHG | TEMPERATURE: 97.2 F | BODY MASS INDEX: 26.14 KG/M2 | RESPIRATION RATE: 16 BRPM | HEIGHT: 72 IN | SYSTOLIC BLOOD PRESSURE: 95 MMHG | OXYGEN SATURATION: 94 %

## 2017-12-01 LAB
ANION GAP SERPL CALCULATED.3IONS-SCNC: 6 MMOL/L (ref 4–13)
BUN SERPL-MCNC: 15 MG/DL (ref 5–25)
CALCIUM SERPL-MCNC: 8.5 MG/DL (ref 8.3–10.1)
CHLORIDE SERPL-SCNC: 104 MMOL/L (ref 100–108)
CO2 SERPL-SCNC: 26 MMOL/L (ref 21–32)
CREAT SERPL-MCNC: 0.74 MG/DL (ref 0.6–1.3)
GFR SERPL CREATININE-BSD FRML MDRD: 90 ML/MIN/1.73SQ M
GLUCOSE SERPL-MCNC: 118 MG/DL (ref 65–140)
HCT VFR BLD AUTO: 27.2 % (ref 36.5–49.3)
HGB BLD-MCNC: 9 G/DL (ref 12–17)
OSMOLALITY UR/SERPL-RTO: 284 MMOL/KG (ref 282–298)
POTASSIUM SERPL-SCNC: 3.8 MMOL/L (ref 3.5–5.3)
SODIUM SERPL-SCNC: 136 MMOL/L (ref 136–145)

## 2017-12-01 PROCEDURE — 97530 THERAPEUTIC ACTIVITIES: CPT

## 2017-12-01 PROCEDURE — 97110 THERAPEUTIC EXERCISES: CPT

## 2017-12-01 PROCEDURE — 80048 BASIC METABOLIC PNL TOTAL CA: CPT | Performed by: PHYSICIAN ASSISTANT

## 2017-12-01 PROCEDURE — 97116 GAIT TRAINING THERAPY: CPT

## 2017-12-01 PROCEDURE — 83930 ASSAY OF BLOOD OSMOLALITY: CPT | Performed by: INTERNAL MEDICINE

## 2017-12-01 PROCEDURE — 85014 HEMATOCRIT: CPT | Performed by: PHYSICIAN ASSISTANT

## 2017-12-01 PROCEDURE — 85018 HEMOGLOBIN: CPT | Performed by: PHYSICIAN ASSISTANT

## 2017-12-01 RX ORDER — FERROUS SULFATE 325(65) MG
325 TABLET ORAL
Qty: 30 TABLET | Refills: 0 | Status: SHIPPED | OUTPATIENT
Start: 2017-12-02 | End: 2020-01-27

## 2017-12-01 RX ORDER — DILTIAZEM HYDROCHLORIDE 180 MG/1
180 CAPSULE, COATED, EXTENDED RELEASE ORAL DAILY
Status: DISCONTINUED | OUTPATIENT
Start: 2017-12-02 | End: 2017-12-01 | Stop reason: HOSPADM

## 2017-12-01 RX ORDER — DILTIAZEM HYDROCHLORIDE 180 MG/1
180 CAPSULE, COATED, EXTENDED RELEASE ORAL DAILY
Qty: 90 CAPSULE | Refills: 0 | Status: SHIPPED | OUTPATIENT
Start: 2017-12-02

## 2017-12-01 RX ADMIN — TRAMADOL HYDROCHLORIDE 50 MG: 50 TABLET, FILM COATED ORAL at 08:23

## 2017-12-01 RX ADMIN — PANTOPRAZOLE SODIUM 40 MG: 40 TABLET, DELAYED RELEASE ORAL at 05:28

## 2017-12-01 RX ADMIN — Medication 1 TABLET: at 08:22

## 2017-12-01 RX ADMIN — SENNOSIDES 8.6 MG: 8.6 TABLET, FILM COATED ORAL at 08:23

## 2017-12-01 RX ADMIN — TRAMADOL HYDROCHLORIDE 50 MG: 50 TABLET, FILM COATED ORAL at 15:55

## 2017-12-01 RX ADMIN — APIXABAN 2.5 MG: 2.5 TABLET, FILM COATED ORAL at 08:23

## 2017-12-01 RX ADMIN — CELECOXIB 200 MG: 200 CAPSULE ORAL at 08:23

## 2017-12-01 RX ADMIN — OXYCODONE HYDROCHLORIDE AND ACETAMINOPHEN 500 MG: 500 TABLET ORAL at 08:23

## 2017-12-01 RX ADMIN — FERROUS SULFATE TAB 325 MG (65 MG ELEMENTAL FE) 325 MG: 325 (65 FE) TAB at 08:23

## 2017-12-01 RX ADMIN — SODIUM CHLORIDE, SODIUM LACTATE, POTASSIUM CHLORIDE, AND CALCIUM CHLORIDE 75 ML/HR: 600; 310; 30; 20 INJECTION, SOLUTION INTRAVENOUS at 00:26

## 2017-12-01 NOTE — PLAN OF CARE
Problem: PHYSICAL THERAPY ADULT  Goal: Performs mobility at highest level of function for planned discharge setting  See evaluation for individualized goals  Treatment/Interventions: Functional transfer training, LE strengthening/ROM, Elevations, Therapeutic exercise, Endurance training, Patient/family training, Bed mobility, Gait training, Spoke to nursing, Family  Equipment Recommended: Mirtha Friend, Andria (Comment) ( & [facility])       See flowsheet documentation for full assessment, interventions and recommendations  Outcome: Progressing  Prognosis: Good  Problem List: Decreased strength, Decreased range of motion, Decreased endurance, Decreased mobility, Impaired balance, Pain  Assessment: Pt  seated in bedside chair upon my arrival  Able to complete all transfers practicing proper technique with no noted LOB  BP taken seated at 103/54 prior to therapy session with no reports of symptoms by pt  Transferred to transport chair and taken to steps for stair training  Able to complete stair training practicing proper technique with no noted LOB  Pt  progressed with increased amb  trial with RW and standbyA of therapist with no noted LOB  Noted improvement in LE sequencing this treatment session  Returned to room and repositioned seated in bedside chair at end of treatment session with ice applied at end of treatment session  [address] issued to pt  for home stair training  Pt  has completed all his goals and is safe for d/c home with HHPT and family support as needed when medically stable for d/c    Barriers to Discharge: None     Recommendation: Home PT, Home with family support     PT - OK to Discharge: Yes (if d/c when medically stable )    See flowsheet documentation for full assessment

## 2017-12-01 NOTE — DISCHARGE SUMMARY
DISCHARGE SUMMARY      Patient Name: Mane Glass  Patient MRN: [id number]  Admitting Provider: Kori Wiggins MD  Discharging Provider: Kori Wiggins MD  Primary Care Physician at Discharge: Gerhardt Messick, [phone]  Admission Date: 11/29/2017       Discharge Date: 12/1/17    Admission Diagnosis  Primary osteoarthritis of right hip [M16 11]    Discharge Diagnoses  [facility] Course  Mane Glass was admitted to [address] [place] [address] on 11/29/2017, with diagnosis of osteoarthritis in his Right hip  On the day of admission, he was brought to surgery, successfully underwent a Right hip replacement  He tolerated the procedure well  Following surgery, he was taken to the recovery room, at which time, there was a consult placed for Dr Kelly Lockett for the patient's medical management  After a short stay in the recovery room, he was transferred to the orthopedic floor at which time, he was resumed on his routine home medications per the hospitalist group  On postop day #1, he was able to start some physical therapy and was able to tolerate it well  At this time, he was in stable condition, showing no sign of deep vein thrombosis or pulmonary embolism  Incision was healing well at the time and showed no signs of infection  DISCHARGE DIAGNOSIS: Primary osteoarthritis of right hip [M16 11]  He is now status post Right total hip replacement, which he underwent during the hospital stay  Medications  See after visit summary for reconciled discharge medications provided to patient and family      Allergies  No Known Allergies    Outpatient Follow-Up  Future Appointments  Date Time Provider Kristina Will   3/2/2018 8:30 AM Jos Arellano MD [facility] appts None       Discharge Disposition  home    Operative Procedures Performed  Procedure(s):  ARTHROPLASTY HIP TOTAL ANTERIOR        Malick Braxton PA-C    DISCHARGE SUMMARY

## 2017-12-01 NOTE — PROGRESS NOTES
Progress Note - Internal Medicine   Bari Cruz 69 y o  male MRN: [id number]  Unit/Bed#: E2 -01 Encounter: [id number]      Impression :    POD # 2, status post right total hip replacement by Dr Carlos Roldan  Advanced end-stage DJD right hip  Chronic atrial fibrillation (patient was off antiarrhythmic drugs on his own preoperatively 5 days by mistake)  Armaan Clement secondary to atrial fibrillation and flutter  Hypercholesterolemia  Chronically anticoagulated with Factor Xa inhibitor-Eliquis  Prostate cancer  Previous history of left carpal tunnel surgery 2014  Mild dilutional hyponatremia, sodium 131  ~~~>> 136  Mild acute blood loss anemia  Hb 9      Recommendation:    Discussed with patient's current outpatient cardiologist Dr Alis Harrison  Reviewed patient's blood pressure and heart rate and decision made to reduce Cardizem further down to 180 mg  Patient has been advised to monitor his blood pressure and heart rate regularly and fortunately he will have home health care services go along with him  I have advised him to contact his cardiologist for further optimization or changes to his Cardizem as needed  I will prescribe him 90 tablets of Cardizem and this may need further adjustments depending on his response over next couple of weeks  Continue ferrous sulfate ascorbic acid, improvement of his sodium noted, continue low-dose Eliquis as per orthopedic team for DVT and PE prophylaxis  Subsequent to completion of 6 weeks on low-dose Eliquis he may resume his routine regular dose of Eliquis as being recommended by his cardiologist for prevention of thromboembolic stroke  Discussed with patient's wife in detail all questions have been answered to the fullest extent  Patient is medically stable and can be discharged after discontinuation of her telemetry      Total time reviewing patient's care and discussing with patient his wife and with his outpatient cardiologist was more than 35 minutes  Subjective:     Patient seen and examined today  EMR and overnight events reviewed  Patient resting comfortably on the site chair just completed a session with the therapy team   His blood pressure was around 072 systolic and heart rate was below 55 and hence his Cardizem dose this morning was not given and was confirmed with JOHNATHAN Mandel  Patient has been participating with therapy without any discomfort like dizziness lightheadedness or chest pain  Patient and his wife are keen that they be discharged later today  He has no pain in his operated surgical site and has no drainage discharge and has no leg swelling  Review of Systems     Constitutional: No chills, diaphoresis, fatigue or fever  HEENT: No sore throat  No visual complaints  Respiratory: No shortness of breath and wheezing  Cardiovascular: No chest pain and palpitations  No Syncope or grey out  GI: Negative for abdominal distention, pain, constipation, diarrhea or nausea  Endocrine: Negative for cold or heat intolerance, polydipsia and polyuria  Genitourinary: Negative for dysuria, flank pain and urgency  Skin: Negative for rash  Neurological: Negative for dizziness, weakness, numbness and headaches  Hematological: Negative for spontaneous bruising or bleeding  Psychiatric: Negative for dysphoric mood, hallucinations  All other systems reviewed and are negative  OBJECTIVE:     Vitals:     HR:  [51-69] 51  Resp:  [16-18] 16  BP: ()/(52-67) 102/67  SpO2:  [90 %-97 %] 93 %  Temp (24hrs), Av 4 °F (36 9 °C), Min:97 7 °F (36 5 °C), Max:99 °F (37 2 °C)  Current: Temperature: 97 7 °F (36 5 °C)    Intake/Output Summary (Last 24 hours) at 17 1102  Last data filed at 17 0923   Gross per 24 hour   Intake          2283 75 ml   Output             1750 ml   Net           533 75 ml     Body mass index is 26 18 kg/m²  Physical Exam    General Appearance:  Awake,alert, cooperative   Not in distress  Pallor / Icterus/ Cyanosis negative  Oropharynx no thrush  Tongue protrudes in midline  Head:  Normocephalic, atraumatic  No titubations  Eyes:  No eye redness or discharge bilaterally  Neck: Supple, no raised JVP  Lungs:   B/L Clear to auscultation, no wheezing or rhonchi  Normal broncho-alveolar air entry  No pleural rubs  Heart:   Regular S1S2, A2P2 normal, no murmur or gallop  Abdomen:   Soft, non-tender,no rebound, bowel sounds+  Musculoskeletal: Extremities no cyanosis or edema  Surgical site has clean dressing  No discharge or drainage  Mild diminution and limited range of motion   Psych: Normal Affect / No hallucinations or delusions  Neurologic:             Skin:  Endocrine:  Vascular: Awake and alert  Mentation intact  Speech is intact  Facial symmetry is maintained  Good shoulder shrug and hand grasp  Muscle strength is 5/5 in all muscle groups except on operated limb which is limited due to recent surgery  Light touch and temperature sensation is maintained  No rashes /purpura  No open wounds  No thyromegaly / no lid lag  Homans sign is negative  B/L Calf are supple and non tender         Labs, Imaging, & Other studies:    All pertinent labs and imaging studies were personally reviewed    Results from last 7 days  Lab Units 12/01/17 0522 11/30/17  0551   HEMOGLOBIN g/dL 9 0* 10 5*       Results from last 7 days  Lab Units 12/01/17 0522 11/30/17  0551   SODIUM mmol/L 136 131*   POTASSIUM mmol/L 3 8 3 7   CHLORIDE mmol/L 104 101   CO2 mmol/L 26 27   ANION GAP mmol/L 6 3*   BUN mg/dL 15 10   CREATININE mg/dL 0 74 0 82   EGFR ml/min/1 73sq m 90 86   GLUCOSE RANDOM mg/dL 118 105   CALCIUM mg/dL 8 5 8 2*           Current Meds:  Current Facility-Administered Medications   Medication Dose Route Frequency Provider Last Rate Last Dose    acetaminophen (TYLENOL) tablet 650 mg  650 mg Oral Q6H PRN Essence Tejeda PA-C        apixaban (ELIQUIS) tablet 2 5 mg  2 5 mg Oral BID RHONDA Carvajal-C   2 5 mg at 12/01/17 6781    ascorbic acid (VITAMIN C) tablet 500 mg  500 mg Oral Daily Rakel Freeman MD   500 mg at 12/01/17 7642    atorvastatin (LIPITOR) tablet 20 mg  20 mg Oral Once per day on Mon Wed Fri DELMI BautistaC   20 mg at 11/29/17 1528    calcium carbonate (TUMS) chewable tablet 1,000 mg  1,000 mg Oral Daily PRN RHONDA Carvajal-C        celecoxib (CeleBREX) capsule 200 mg  200 mg Oral Daily Rosangela Rosado, PA-C   200 mg at 12/01/17 8292    [START ON 12/2/2017] diltiazem (CARDIZEM CD) 24 hr capsule 180 mg  180 mg Oral Daily Rakel Freeman MD        ferrous sulfate tablet 325 mg  325 mg Oral Daily With Breakfast Rakel Freeman MD   325 mg at 12/01/17 8218    methocarbamol (ROBAXIN) tablet 500 mg  500 mg Oral Q6H PRN Rosangela Rosado PA-C        multivitamin-minerals (CENTRUM) tablet 1 tablet  1 tablet Oral Daily Rosangela Rosado PA-C   1 tablet at 12/01/17 1316    oxyCODONE (ROXICODONE) immediate release tablet 10 mg  10 mg Oral Q4H PRN Rosangela Rosado PA-C        oxyCODONE (ROXICODONE) IR tablet 5 mg  5 mg Oral Q4H PRN Rosangela Rosado, PA-C        pantoprazole (PROTONIX) EC tablet 40 mg  40 mg Oral Early Morning Rosangela Rosado PA-C   40 mg at 12/01/17 0003    senna (SENOKOT) tablet 8 6 mg  1 tablet Oral Daily Rosangela Rosado, PA-C   8 6 mg at 12/01/17 3743    traMADol (ULTRAM) tablet 50 mg  50 mg Oral Q6H PRN Rosangela Rosado PA-C   50 mg at 12/01/17 6213     Home Meds:  Prescriptions Prior to Admission   Medication    apixaban (ELIQUIS) 5 mg    atorvastatin (LIPITOR) 20 mg tablet    diltiazem (TIAZAC) 300 MG 24 hr capsule    Leuprolide Acetate (LUPRON SC)    Multiple Vitamins-Minerals (MULTIVITAMIN WITH MINERALS) tablet    Omega-3 Fatty Acids (FISH OIL PO)    TRAMADOL HCL PO       Continuous Infusions:       Invasive Devices     Peripheral Intravenous Line            Peripheral IV 11/30/17 Left Hand less than 1 day                VTE Pharmacologic Prophylaxis: Eliquis  as per Primary Ortho Team   VTE Mechanical Prophylaxis: sequential compression device    Portions of the record may have been created with voice recognition software  Occasional wrong word or "sound a like" substitutions may have occurred due to the inherent limitations of voice recognition software  Read the chart carefully and recognize, using context, where substitutions have occurred

## 2017-12-01 NOTE — PROGRESS NOTES
Orthopedic Total Hip Progress Note      Subjective     Status post-Right total hip arthroplasty  Systemic or Specific Complaints: No Complaints    Objective     Vital signs in last 24 hours:  Temp:  [98 °F (36 7 °C)-99 6 °F (37 6 °C)] 98 °F (36 7 °C)  HR:  [55-84] 63  Resp:  [16-18] 18  BP: ()/(52-74) 96/64    Neurovascular: Capillary refill: Normal  Dorsiflexion: 5/5  Plantarflexion:  5/5   Wound: Dressing:  clean/dry/intact   DVT Exam: No evidence of DVT seen on physical exam      Data Review:  CBC:   Lab Results   Component Value Date    WBC 5 95 10/31/2017    RBC 4 03 10/31/2017    HGB 9 0 (L) 12/01/2017    HCT 27 2 (L) 12/01/2017     10/31/2017       Assessment/Plan     Status post-Right total hip arthroplasty: Doing well postoperatively  Discharge today, Return to Clinic: as scheduled if cleared by therapy and Medicine      Activity: ad yasmani      Cristina Gillespie PA-C    Date: 12/1/2017  Time: 7:09 AM

## 2017-12-01 NOTE — PROGRESS NOTES
Pt is aware that Dr Annette Perez will call the [facility] the [facility] return to the hospital this evening

## 2017-12-01 NOTE — SOCIAL WORK
CM met with patient and spouse at bedside to address discharge needs; patient reports living in a two story house with spouse  Patient is independent with ADLs and functional mobility  Patient denies use of DME PTA; patient will need RW at discharge; referral submitted  Patient will need Home PT; referral submitted  POA identified as patient's spouse  Patient will have a ride home  Patient and spouse made aware of CM's name and role  No other needs at present  CM to follow as needed

## 2017-12-01 NOTE — PHYSICAL THERAPY NOTE
Physical Therapy Progress Note     12/01/17 1043   Pain Assessment   Pain Assessment 0-10   Pain Score 3   Pain Type Surgical pain   Pain Location Hip   Pain Orientation Right   Hospital Pain Intervention(s) Ambulation/increased activity;Cold applied   Response to Interventions Tolerated  Restrictions/Precautions   Weight Bearing Precautions Per Order Yes   RLE Weight Bearing Per Order WBAT   Other Precautions Fall Risk;Pain   General   Chart Reviewed Yes   Response to Previous Treatment Patient with no complaints from previous session  Family/Caregiver Present Yes   Subjective   Subjective Willing to participate in therapy this AM    Transfers   Sit to Stand 5  Supervision   Additional items Assist x 1; Armrests; Increased time required;Verbal cues   Stand to Sit 5  Supervision   Additional items Assist x 1; Armrests; Increased time required;Verbal cues   Car transfer 5  Supervision   Additional items Assist x 1; Armrests; Increased time required;Verbal cues   Ambulation/Elevation   Gait pattern Decreased foot clearance;Decreased R stance; Short stride   Gait Assistance 5  Supervision   Additional items Assist x 1;Verbal cues; Tactile cues   Assistive Device Rolling walker   Distance 150'   Stair Management Assistance 5  Supervision   Additional items Assist x 1;Verbal cues; Tactile cues   Stair Management Technique Two rails; One rail R;Step to pattern; Foreward;Nonreciprocal;With cane   Number of Stairs 9   Balance   Static Sitting Good   Dynamic Sitting Fair +   Static Standing Fair   Dynamic Standing [id number]   Endurance Deficit   Endurance Deficit No   Activity Tolerance   Activity Tolerance Patient tolerated treatment well   Nurse Made Aware Yes   Exercises   THR Sitting;10 reps;AROM; Bilateral   Assessment   Prognosis Good   Problem List Decreased strength;Decreased range of motion;Decreased endurance;Decreased mobility; Impaired balance;Pain   Assessment Pt  seated in bedside chair upon my arrival  Able to complete all transfers practicing proper technique with no noted LOB  BP taken seated at 103/54 prior to therapy session with no reports of symptoms by pt  Transferred to transport chair and taken to steps for stair training  Able to complete stair training practicing proper technique with no noted LOB  Pt  progressed with increased amb  trial with RW and standbyA of therapist with no noted LOB  Noted improvement in LE sequencing this treatment session  Returned to room and repositioned seated in bedside chair at end of treatment session with ice applied at end of treatment session  [facility] issued to pt  for home stair training  Pt  has completed all his goals and is safe for d/c home with HHPT and family support as needed when medically stable for d/c     Barriers to Discharge None   Goals   Patient Goals To go home  STG Expiration Date 12/04/17   Treatment Day 3   Plan   Treatment/Interventions Functional transfer training;LE strengthening/ROM; Elevations; Endurance training; Therapeutic exercise; Bed mobility;Gait training;Spoke to nursing;Spoke to case management; Family   Progress Improving as expected   PT Frequency Twice a day   Recommendation   Recommendation Home PT; Home with family support   PT - OK to Discharge Yes  (if d/c when medically stable )     Antoine Quinteros, PTA

## 2018-01-22 VITALS
BODY MASS INDEX: 24.52 KG/M2 | HEIGHT: 72 IN | DIASTOLIC BLOOD PRESSURE: 76 MMHG | SYSTOLIC BLOOD PRESSURE: 118 MMHG | WEIGHT: 181 LBS

## 2018-02-20 ENCOUNTER — TELEPHONE (OUTPATIENT)
Dept: UROLOGY | Facility: AMBULATORY SURGERY CENTER | Age: 78
End: 2018-02-20

## 2018-02-20 DIAGNOSIS — C61 PROSTATE CA (HCC): Primary | ICD-10-CM

## 2018-02-20 NOTE — TELEPHONE ENCOUNTER
Spoke to patient and will ask Dr Cinthia Vilchis if he would like to have a PSA before his March 2nd visit

## 2018-03-02 ENCOUNTER — OFFICE VISIT (OUTPATIENT)
Dept: UROLOGY | Facility: MEDICAL CENTER | Age: 78
End: 2018-03-02
Payer: MEDICARE

## 2018-03-02 VITALS
WEIGHT: 198 LBS | HEIGHT: 72 IN | SYSTOLIC BLOOD PRESSURE: 126 MMHG | DIASTOLIC BLOOD PRESSURE: 77 MMHG | BODY MASS INDEX: 26.82 KG/M2

## 2018-03-02 DIAGNOSIS — C61 MALIGNANT NEOPLASM OF PROSTATE (HCC): Primary | ICD-10-CM

## 2018-03-02 LAB
SL AMB  POCT GLUCOSE, UA: NORMAL
SL AMB LEUKOCYTE ESTERASE,UA: NORMAL
SL AMB POCT BILIRUBIN,UA: NORMAL
SL AMB POCT BLOOD,UA: NORMAL
SL AMB POCT CLARITY,UA: CLEAR
SL AMB POCT COLOR,UA: YELLOW
SL AMB POCT KETONES,UA: NORMAL
SL AMB POCT NITRITE,UA: NORMAL
SL AMB POCT PH,UA: 5.5
SL AMB POCT SPECIFIC GRAVITY,UA: 1.01
SL AMB POCT URINE PROTEIN: NORMAL
SL AMB POCT UROBILINOGEN: 0.2

## 2018-03-02 PROCEDURE — 99213 OFFICE O/P EST LOW 20 MIN: CPT | Performed by: UROLOGY

## 2018-03-02 PROCEDURE — 81003 URINALYSIS AUTO W/O SCOPE: CPT | Performed by: UROLOGY

## 2018-03-02 RX ORDER — DILTIAZEM HYDROCHLORIDE EXTENDED-RELEASE TABLETS 360 MG/1
TABLET, EXTENDED RELEASE ORAL
COMMUNITY
End: 2018-09-20 | Stop reason: DRUGHIGH

## 2018-03-02 RX ORDER — ATORVASTATIN CALCIUM 20 MG/1
TABLET, FILM COATED ORAL
COMMUNITY
End: 2018-09-18 | Stop reason: SDUPTHER

## 2018-03-02 NOTE — PROGRESS NOTES
Assessment/Plan:  Doing well, no side effects of radiation  He will get his next Lupron and fall 2018, and that will be his last one  I will get his first PSA about six months after that last shot, so that will be the spring of 2019  Follow-up six months for office visit and Lupron  No problem-specific Assessment & Plan notes found for this encounter  Diagnoses and all orders for this visit:    Malignant neoplasm of prostate ([facility] [address] )  -     POCT urine dip auto non-scope    Other orders  -     apixaban (ELIQUIS) 5 mg; Take by mouth  -     atorvastatin (LIPITOR) 20 mg tablet; Take by mouth  -     diltiazem (CARDIZEM LA) 360 MG 24 hr tablet; Take by mouth          Subjective:      Patient ID: Reva Feldman is a 69 y o  male  Now 10 months out from XRT for localized but high-grade prostate cancer  Has done quite well with no side effects of radiation therapy  On all month therapy with no side effects either  Good stream and control, no hematuria infections burning urgency or incontinence  PSA down to 0 as we would expect on Lupron  The following portions of the patient's history were reviewed and updated as appropriate: allergies, current medications, past family history, past medical history, past social history, past surgical history and problem list     Review of Systems   Musculoskeletal:        Making a good recovery from right total hip replacement November 2017   All other systems reviewed and are negative  Objective:      /77 (BP Location: Left arm, Patient Position: Sitting)   Ht 6' (1 829 m)   Wt 89 8 kg (198 lb)   BMI 26 85 kg/m²          Physical Exam   Constitutional: He appears well-developed and well-nourished  HENT:   Head: Normocephalic  Neck: Normal range of motion  Cardiovascular: Normal rate  Pulmonary/Chest: Effort normal and breath sounds normal  No respiratory distress  He has no wheezes  He exhibits no tenderness  Abdominal: Soft   Bowel sounds are normal  He exhibits no distension and no mass  There is no tenderness     Genitourinary: Testes normal and penis normal    Genitourinary Comments: Prostate mildly enlarged, no nodules     Lupron given by the nurse

## 2018-03-02 NOTE — LETTER
March 2, 2018     Emmy Bowser MD  [address]  [address]    Patient: Mamie Pires   YOB: 1940   Date of Visit: 3/2/2018     Dear Dr Filippo Reyes      Thank you for referring Melany You to me for evaluation  Below are the relevant portions of my assessment and plan of care  If you have questions, please do not hesitate to call me  I look forward to following Liv Covington along with you           Sincerely,        Rina Odell MD        CC: No Recipients    Progress Notes:

## 2018-03-08 DIAGNOSIS — C61 MALIGNANT NEOPLASM OF PROSTATE (HCC): ICD-10-CM

## 2018-09-20 ENCOUNTER — APPOINTMENT (OUTPATIENT)
Dept: LAB | Facility: MEDICAL CENTER | Age: 78
End: 2018-09-20
Attending: UROLOGY
Payer: MEDICARE

## 2018-09-20 ENCOUNTER — OFFICE VISIT (OUTPATIENT)
Dept: UROLOGY | Facility: MEDICAL CENTER | Age: 78
End: 2018-09-20
Payer: MEDICARE

## 2018-09-20 VITALS
BODY MASS INDEX: 26.14 KG/M2 | HEIGHT: 72 IN | SYSTOLIC BLOOD PRESSURE: 124 MMHG | WEIGHT: 193 LBS | DIASTOLIC BLOOD PRESSURE: 76 MMHG

## 2018-09-20 DIAGNOSIS — Z85.46 HISTORY OF PROSTATE CANCER: Primary | ICD-10-CM

## 2018-09-20 DIAGNOSIS — Z85.46 HISTORY OF PROSTATE CANCER: ICD-10-CM

## 2018-09-20 DIAGNOSIS — K62.5 RECTAL BLEEDING: ICD-10-CM

## 2018-09-20 LAB
PSA SERPL-MCNC: <0.1 NG/ML (ref 0–4)
SL AMB  POCT GLUCOSE, UA: ABNORMAL
SL AMB LEUKOCYTE ESTERASE,UA: ABNORMAL
SL AMB POCT BILIRUBIN,UA: ABNORMAL
SL AMB POCT BLOOD,UA: ABNORMAL
SL AMB POCT CLARITY,UA: CLEAR
SL AMB POCT COLOR,UA: YELLOW
SL AMB POCT KETONES,UA: ABNORMAL
SL AMB POCT NITRITE,UA: ABNORMAL
SL AMB POCT PH,UA: 5.5
SL AMB POCT SPECIFIC GRAVITY,UA: 1.01
SL AMB POCT URINE PROTEIN: ABNORMAL
SL AMB POCT UROBILINOGEN: 0.2

## 2018-09-20 PROCEDURE — 84153 ASSAY OF PSA TOTAL: CPT

## 2018-09-20 PROCEDURE — 99214 OFFICE O/P EST MOD 30 MIN: CPT | Performed by: UROLOGY

## 2018-09-20 PROCEDURE — 81003 URINALYSIS AUTO W/O SCOPE: CPT | Performed by: UROLOGY

## 2018-09-20 NOTE — PROGRESS NOTES
Assessment/Plan:  1  Doing well  No significant voiding symptoms  2   Rectal bleeding likely hemorrhoids or fragile mucosa at the anus, recommend colorectal evaluation  3   Check PSA an transition to having it done before each visit  4   Does not need further Lupron at this point  No problem-specific Assessment & Plan notes found for this encounter  Diagnoses and all orders for this visit:    History of prostate cancer  -     POCT urine dip auto non-scope  -     PSA Total, Diagnostic; Future    Rectal bleeding    Other orders  -     Bioflavonoid Products (BIOFLEX PO); Take by mouth          Subjective:      Patient ID: Ariana Wells is a 69 y o  male  Now 18 months some radiation therapy for prostate cancer  Five cores out of 12, Conner's six and seven  Had hormone therapy the first 12 months  Still has mild hot flashes which do not bother him that much  No voiding symptoms, good stream and control nocturia times 1-2  Complains of rectal bleeding used on the tissue bright red after a straining bowel movement  The following portions of the patient's history were reviewed and updated as appropriate: allergies, current medications, past family history, past medical history, past social history, past surgical history and problem list     Review of Systems   All other systems reviewed and are negative  Objective:      /76   Ht 6' (1 829 m)   Wt 87 5 kg (193 lb)   BMI 26 18 kg/m²          Physical Exam   Constitutional: He is oriented to person, place, and time  He appears well-developed and well-nourished  No distress  HENT:   Head: Normocephalic and atraumatic  Eyes: Conjunctivae are normal    Cardiovascular: Normal rate and regular rhythm  Pulmonary/Chest: Effort normal and breath sounds normal  No respiratory distress  He has no wheezes  Abdominal: Soft  Bowel sounds are normal  He exhibits no distension and no mass  There is no tenderness     Genitourinary: Genitourinary Comments: Penis normal  Testes atrophic  Prostate minimally enlarged slightly irregular no nodules   Neurological: He is alert and oriented to person, place, and time  Skin: Skin is warm and dry  He is not diaphoretic

## 2018-09-20 NOTE — LETTER
September 20, 2018     Demarcus Rodríguez MD  [address]  [place] [address]    Patient: Ariana Wells   YOB: 1940   Date of Visit: 9/20/2018       Dear Dr Sandy Jarrell: Thank you for referring Burgess Red to me for evaluation  Below are my notes for this consultation  If you have questions, please do not hesitate to call me  I look forward to following your patient along with you  Sincerely,        Carmen Arredondo MD        CC: No Recipients  Carmen Arredondo MD  9/20/2018  8:00 AM  Sign at close encounter  Assessment/Plan:  1  Doing well  No significant voiding symptoms  2   Rectal bleeding likely hemorrhoids or fragile mucosa at the anus, recommend colorectal evaluation  3   Check PSA an transition to having it done before each visit  4   Does not need further Lupron at this point  No problem-specific Assessment & Plan notes found for this encounter  Diagnoses and all orders for this visit:    History of prostate cancer  -     POCT urine dip auto non-scope  -     PSA Total, Diagnostic; Future    Rectal bleeding    Other orders  -     Bioflavonoid Products (BIOFLEX PO); Take by mouth          Subjective:      Patient ID: Ariana Wells is a 69 y o  male  Now 18 months some radiation therapy for prostate cancer  Five cores out of 12, Maribel's six and seven  Had hormone therapy the first 12 months  Still has mild hot flashes which do not bother him that much  No voiding symptoms, good stream and control nocturia times 1-2  Complains of rectal bleeding used on the tissue bright red after a straining bowel movement  The following portions of the patient's history were reviewed and updated as appropriate: allergies, current medications, past family history, past medical history, past social history, past surgical history and problem list     Review of Systems   All other systems reviewed and are negative          Objective:      /76   Ht 6' (1 829 m)   [employer] 87 5 kg (193 lb)   BMI 26 18 kg/m²           Physical Exam   Constitutional: He is oriented to person, place, and time  He appears well-developed and well-nourished  No distress  HENT:   Head: Normocephalic and atraumatic  Eyes: Conjunctivae are normal    Cardiovascular: Normal rate and regular rhythm  Pulmonary/Chest: Effort normal and breath sounds normal  No respiratory distress  He has no wheezes  Abdominal: Soft  Bowel sounds are normal  He exhibits no distension and no mass  There is no tenderness  Genitourinary:   Genitourinary Comments: Penis normal  Testes atrophic  Prostate minimally enlarged slightly irregular no nodules   Neurological: He is alert and oriented to person, place, and time  Skin: Skin is warm and dry  He is not diaphoretic

## 2018-09-25 ENCOUNTER — TELEPHONE (OUTPATIENT)
Dept: UROLOGY | Facility: MEDICAL CENTER | Age: 78
End: 2018-09-25

## 2018-09-25 NOTE — TELEPHONE ENCOUNTER
Dr Bennie Ramirez walked in asking for a referral for a colonoscopy that you had recommended for him  He is unsure of the doctor's name in [place]  If you need that, please let me know and I will call him at home for that information  Thank you!

## 2018-09-27 DIAGNOSIS — K62.5 RECTAL BLEEDING: Primary | ICD-10-CM

## 2019-04-02 ENCOUNTER — TELEPHONE (OUTPATIENT)
Dept: UROLOGY | Facility: MEDICAL CENTER | Age: 79
End: 2019-04-02

## 2019-04-02 DIAGNOSIS — C61 MALIGNANT NEOPLASM OF PROSTATE (HCC): Primary | ICD-10-CM

## 2019-04-08 ENCOUNTER — OFFICE VISIT (OUTPATIENT)
Dept: UROLOGY | Facility: MEDICAL CENTER | Age: 79
End: 2019-04-08
Payer: MEDICARE

## 2019-04-08 VITALS
WEIGHT: 196 LBS | SYSTOLIC BLOOD PRESSURE: 122 MMHG | HEART RATE: 72 BPM | HEIGHT: 72 IN | BODY MASS INDEX: 26.55 KG/M2 | DIASTOLIC BLOOD PRESSURE: 72 MMHG

## 2019-04-08 DIAGNOSIS — Z85.46 HISTORY OF PROSTATE CANCER: Primary | ICD-10-CM

## 2019-04-08 DIAGNOSIS — R35.0 URINARY FREQUENCY: ICD-10-CM

## 2019-04-08 PROBLEM — C61 MALIGNANT NEOPLASM OF PROSTATE (HCC): Status: RESOLVED | Noted: 2018-03-02 | Resolved: 2019-04-08

## 2019-04-08 LAB
SL AMB  POCT GLUCOSE, UA: NORMAL
SL AMB LEUKOCYTE ESTERASE,UA: NORMAL
SL AMB POCT BILIRUBIN,UA: NORMAL
SL AMB POCT BLOOD,UA: NORMAL
SL AMB POCT CLARITY,UA: CLEAR
SL AMB POCT COLOR,UA: YELLOW
SL AMB POCT KETONES,UA: NORMAL
SL AMB POCT NITRITE,UA: NORMAL
SL AMB POCT PH,UA: 6.5
SL AMB POCT SPECIFIC GRAVITY,UA: 1.01
SL AMB POCT URINE PROTEIN: NORMAL
SL AMB POCT UROBILINOGEN: 0.2

## 2019-04-08 PROCEDURE — 81003 URINALYSIS AUTO W/O SCOPE: CPT | Performed by: UROLOGY

## 2019-04-08 PROCEDURE — 99214 OFFICE O/P EST MOD 30 MIN: CPT | Performed by: UROLOGY

## 2019-04-08 RX ORDER — MESALAMINE 400 MG/1
CAPSULE, DELAYED RELEASE ORAL
COMMUNITY
Start: 2019-01-23 | End: 2020-02-17 | Stop reason: ALTCHOICE

## 2019-04-08 RX ORDER — TAMSULOSIN HYDROCHLORIDE 0.4 MG/1
CAPSULE ORAL
Qty: 90 CAPSULE | Refills: 3 | Status: SHIPPED | OUTPATIENT
Start: 2019-04-08 | End: 2020-05-20 | Stop reason: SDUPTHER

## 2019-10-09 ENCOUNTER — APPOINTMENT (OUTPATIENT)
Dept: LAB | Facility: MEDICAL CENTER | Age: 79
End: 2019-10-09
Attending: UROLOGY
Payer: MEDICARE

## 2019-10-09 DIAGNOSIS — C61 MALIGNANT NEOPLASM OF PROSTATE (HCC): ICD-10-CM

## 2019-10-09 DIAGNOSIS — Z85.46 HISTORY OF PROSTATE CANCER: ICD-10-CM

## 2019-10-09 LAB — PSA SERPL-MCNC: 0.2 NG/ML (ref 0–4)

## 2019-10-09 PROCEDURE — 84153 ASSAY OF PSA TOTAL: CPT

## 2019-10-09 PROCEDURE — 36415 COLL VENOUS BLD VENIPUNCTURE: CPT

## 2019-10-17 ENCOUNTER — OFFICE VISIT (OUTPATIENT)
Dept: UROLOGY | Facility: MEDICAL CENTER | Age: 79
End: 2019-10-17
Payer: MEDICARE

## 2019-10-17 VITALS
DIASTOLIC BLOOD PRESSURE: 70 MMHG | HEART RATE: 79 BPM | HEIGHT: 72 IN | WEIGHT: 190 LBS | BODY MASS INDEX: 25.73 KG/M2 | SYSTOLIC BLOOD PRESSURE: 122 MMHG

## 2019-10-17 DIAGNOSIS — Z85.46 HISTORY OF PROSTATE CANCER: Primary | ICD-10-CM

## 2019-10-17 LAB
SL AMB  POCT GLUCOSE, UA: NORMAL
SL AMB LEUKOCYTE ESTERASE,UA: NORMAL
SL AMB POCT BILIRUBIN,UA: NORMAL
SL AMB POCT BLOOD,UA: NORMAL
SL AMB POCT CLARITY,UA: CLEAR
SL AMB POCT COLOR,UA: YELLOW
SL AMB POCT KETONES,UA: NORMAL
SL AMB POCT NITRITE,UA: NORMAL
SL AMB POCT PH,UA: 6
SL AMB POCT SPECIFIC GRAVITY,UA: 1.01
SL AMB POCT URINE PROTEIN: NORMAL
SL AMB POCT UROBILINOGEN: 0.2

## 2019-10-17 PROCEDURE — 81003 URINALYSIS AUTO W/O SCOPE: CPT | Performed by: UROLOGY

## 2019-10-17 PROCEDURE — 99214 OFFICE O/P EST MOD 30 MIN: CPT | Performed by: UROLOGY

## 2019-10-17 NOTE — PATIENT INSTRUCTIONS
Have the PSA blood test done in april  We will get you a phone report  If the number is acceptable, you will not need an exam at that time  If you do not hear from us within one week after having the blood test done, please call us and say, I am waiting for my PSA! 

## 2019-10-17 NOTE — PROGRESS NOTES
HISTORY:  Follow-up for prostate cancer  PSA now 0 2, creeping up slowly from the 0 level where it has been because of the Lupron he took after radiation  S/pradiation therapy in April 2017 for prostate cancer  initial pathology:  Five cores out of 12, Conner's six and seven  Had hormone therapy the first 12 months  No longer any hot flashes  Good relief of his voiding symptoms with tamsulosin, good stream and control nocturia times 1-2  ASSESSMENT / PLAN:    Doing well  PSA my creep up a bit more  PSA in six months with phone report  Follow-up one year with rectal exam and PSA    The following portions of the patient's history were reviewed and updated as appropriate: allergies, current medications, past family history, past medical history, past social history, past surgical history and problem list     Review of Systems   All other systems reviewed and are negative  Objective:     Physical Exam   Constitutional: He appears well-developed and well-nourished  Genitourinary:   Genitourinary Comments: Penis testes normal  Prostate minimally enlarged no nodules         0   Lab Value Date/Time    PSA 0 2 10/09/2019 1132    PSA <0 1 09/20/2018 0833   ]  BUN   Date Value Ref Range Status   12/01/2017 15 5 - 25 mg/dL Final     Creatinine   Date Value Ref Range Status   12/01/2017 0 74 0 60 - 1 30 mg/dL Final     Comment:     Standardized to IDMS reference method     No components found for: CBC      Patient Active Problem List   Diagnosis    Primary osteoarthritis of right hip    Rectal bleeding    History of prostate cancer    Urinary frequency        Diagnoses and all orders for this visit:    History of prostate cancer  -     POCT urine dip auto non-scope  -     PSA Total, Diagnostic; Future           Patient ID: Yessica Galindo is a 67 y o  male        Current Outpatient Medications:     apixaban (ELIQUIS) 2 5 mg, Take 1 tablet by mouth 2 (two) times a day, Disp: 60 tablet, Rfl: 0    atorvastatin (LIPITOR) 20 mg tablet, Take 20 mg by mouth 3 (three) times a week, Disp: , Rfl:     Bioflavonoid Products (BIOFLEX PO), Take by mouth, Disp: , Rfl:     celecoxib (CeleBREX) 200 mg capsule, Take 1 capsule by mouth daily, Disp: 60 capsule, Rfl: 0    DELZICOL 400 MG, , Disp: , Rfl:     diltiazem (CARDIZEM CD) 180 mg 24 hr capsule, Take 1 capsule by mouth daily, Disp: 90 capsule, Rfl: 0    ferrous sulfate 325 (65 Fe) mg tablet, Take 1 tablet by mouth daily with breakfast, Disp: 30 tablet, Rfl: 0    methocarbamol (ROBAXIN) 500 mg tablet, Take 1 tablet by mouth every 6 (six) hours as needed for muscle spasms, Disp: 40 tablet, Rfl: 0    Multiple Vitamins-Minerals (MULTIVITAMIN WITH MINERALS) tablet, Take 1 tablet by mouth daily, Disp: , Rfl:     senna (SENOKOT) 8 6 mg, Take 1 tablet by mouth daily, Disp: 120 each, Rfl: 0    tamsulosin (FLOMAX) 0 4 mg, Once a day right after supper, Disp: 90 capsule, Rfl: 3    TRAMADOL HCL PO, Take 30 mg by mouth daily as needed "usually takes at bedtime if needed and 1/2 to 1 tab", Disp: , Rfl:     Past Medical History:   Diagnosis Date    Arthritis     Atrial fibrillation (HCC)     Cancer ([facility] [address] )     prostate    Dental crowns present     Elevated prostate specific antigen (PSA)     Frequency of micturition     History of pleurisy 1974    History of pneumonia     several years ago    History of prostate cancer     History of radiation therapy     final treatment May 2017    [place] (hard of hearing)     bilat w hearing aids    Hypercholesterolemia     Hyperlipidemia     Left thigh pain     Nocturia     Pneumonia     Right hip pain     Unspecified osteoarthritis, unspecified site     Unsteady gait     Wears glasses        Past Surgical History:   Procedure Laterality Date    CARPAL TUNNEL RELEASE Bilateral     COLONOSCOPY      FOOT SURGERY Right     HEMORRHOID SURGERY      [facility] TOTAL HIP ARTHROPLASTY Right 11/29/2017    Procedure: ARTHROPLASTY HIP TOTAL ANTERIOR;  Surgeon: Mary Antony MD;  Location: [facility];  Service: Orthopedics    TRANSURETHRAL NEEDLE ABLATION OF THE PROSTATE         Social History

## 2020-01-24 ENCOUNTER — ANESTHESIA EVENT (OUTPATIENT)
Dept: PERIOP | Facility: HOSPITAL | Age: 80
DRG: 470 | End: 2020-01-24
Payer: MEDICARE

## 2020-01-24 RX ORDER — SODIUM CHLORIDE 9 MG/ML
125 INJECTION, SOLUTION INTRAVENOUS CONTINUOUS
Status: CANCELLED | OUTPATIENT
Start: 2020-02-19

## 2020-01-27 ENCOUNTER — HOSPITAL ENCOUNTER (OUTPATIENT)
Dept: NON INVASIVE DIAGNOSTICS | Facility: HOSPITAL | Age: 80
Discharge: HOME | End: 2020-01-27
Attending: ORTHOPAEDIC SURGERY
Payer: MEDICARE

## 2020-01-27 ENCOUNTER — APPOINTMENT (OUTPATIENT)
Dept: PREADMISSION TESTING | Facility: HOSPITAL | Age: 80
End: 2020-01-27
Payer: MEDICARE

## 2020-01-27 ENCOUNTER — TRANSCRIBE ORDERS (OUTPATIENT)
Dept: ADMINISTRATIVE | Facility: HOSPITAL | Age: 80
End: 2020-01-27

## 2020-01-27 ENCOUNTER — APPOINTMENT (OUTPATIENT)
Dept: LAB | Facility: HOSPITAL | Age: 80
End: 2020-01-27
Attending: ORTHOPAEDIC SURGERY
Payer: MEDICARE

## 2020-01-27 ENCOUNTER — HOSPITAL ENCOUNTER (OUTPATIENT)
Dept: RADIOLOGY | Facility: HOSPITAL | Age: 80
Discharge: HOME | End: 2020-01-27
Attending: ORTHOPAEDIC SURGERY
Payer: MEDICARE

## 2020-01-27 DIAGNOSIS — M16.12 PRIMARY OSTEOARTHRITIS OF LEFT HIP: ICD-10-CM

## 2020-01-27 DIAGNOSIS — Z01.818 OTHER SPECIFIED PRE-OPERATIVE EXAMINATION: Primary | ICD-10-CM

## 2020-01-27 DIAGNOSIS — Z01.818 OTHER SPECIFIED PRE-OPERATIVE EXAMINATION: ICD-10-CM

## 2020-01-27 LAB
ABO GROUP BLD: NORMAL
ALBUMIN SERPL BCP-MCNC: 3.8 G/DL (ref 3.5–5)
ALP SERPL-CCNC: 120 U/L (ref 46–116)
ALT SERPL W P-5'-P-CCNC: 7 U/L (ref 12–78)
ANION GAP SERPL CALCULATED.3IONS-SCNC: 9 MMOL/L (ref 4–13)
AST SERPL W P-5'-P-CCNC: 14 U/L (ref 5–45)
BASOPHILS # BLD AUTO: 0.05 THOUSANDS/ΜL (ref 0–0.1)
BASOPHILS NFR BLD AUTO: 1 % (ref 0–1)
BILIRUB SERPL-MCNC: 0.35 MG/DL (ref 0.2–1)
BILIRUB UR QL STRIP: NEGATIVE
BLD GP AB SCN SERPL QL: NEGATIVE
BUN SERPL-MCNC: 12 MG/DL (ref 5–25)
CALCIUM SERPL-MCNC: 9 MG/DL (ref 8.3–10.1)
CHLORIDE SERPL-SCNC: 106 MMOL/L (ref 100–108)
CLARITY UR: CLEAR
CO2 SERPL-SCNC: 28 MMOL/L (ref 21–32)
COLOR UR: YELLOW
CREAT SERPL-MCNC: 0.84 MG/DL (ref 0.6–1.3)
EOSINOPHIL # BLD AUTO: 0.31 THOUSAND/ΜL (ref 0–0.61)
EOSINOPHIL NFR BLD AUTO: 4 % (ref 0–6)
ERYTHROCYTE [DISTWIDTH] IN BLOOD BY AUTOMATED COUNT: 13.5 % (ref 11.6–15.1)
GFR SERPL CREATININE-BSD FRML MDRD: 84 ML/MIN/1.73SQ M
GLUCOSE SERPL-MCNC: 89 MG/DL (ref 65–140)
GLUCOSE UR STRIP-MCNC: NEGATIVE MG/DL
HCT VFR BLD AUTO: 39.9 % (ref 36.5–49.3)
HGB BLD-MCNC: 12.3 G/DL (ref 12–17)
HGB UR QL STRIP.AUTO: NEGATIVE
IMM GRANULOCYTES # BLD AUTO: 0.02 THOUSAND/UL (ref 0–0.2)
IMM GRANULOCYTES NFR BLD AUTO: 0 % (ref 0–2)
KETONES UR STRIP-MCNC: NEGATIVE MG/DL
LEUKOCYTE ESTERASE UR QL STRIP: NEGATIVE
LYMPHOCYTES # BLD AUTO: 1.56 THOUSANDS/ΜL (ref 0.6–4.47)
LYMPHOCYTES NFR BLD AUTO: 19 % (ref 14–44)
MCH RBC QN AUTO: 29.1 PG (ref 26.8–34.3)
MCHC RBC AUTO-ENTMCNC: 30.8 G/DL (ref 31.4–37.4)
MCV RBC AUTO: 95 FL (ref 82–98)
MONOCYTES # BLD AUTO: 0.82 THOUSAND/ΜL (ref 0.17–1.22)
MONOCYTES NFR BLD AUTO: 10 % (ref 4–12)
NEUTROPHILS # BLD AUTO: 5.65 THOUSANDS/ΜL (ref 1.85–7.62)
NEUTS SEG NFR BLD AUTO: 66 % (ref 43–75)
NITRITE UR QL STRIP: NEGATIVE
NRBC BLD AUTO-RTO: 0 /100 WBCS
PH UR STRIP.AUTO: 5.5 [PH]
PLATELET # BLD AUTO: 282 THOUSANDS/UL (ref 149–390)
PMV BLD AUTO: 9.9 FL (ref 8.9–12.7)
POTASSIUM SERPL-SCNC: 4.1 MMOL/L (ref 3.5–5.3)
PROT SERPL-MCNC: 7.7 G/DL (ref 6.4–8.2)
PROT UR STRIP-MCNC: NEGATIVE MG/DL
QRS AXIS: -15 DEGREES
QRSD INTERVAL: 74 MS
QT INTERVAL: 388 MS
QTC INTERVAL: 409 MS
RBC # BLD AUTO: 4.22 MILLION/UL (ref 3.88–5.62)
RH BLD: POSITIVE
SODIUM SERPL-SCNC: 143 MMOL/L (ref 136–145)
SP GR UR STRIP.AUTO: >=1.03 (ref 1–1.03)
SPECIMEN EXPIRATION DATE: NORMAL
T WAVE AXIS: -5 DEGREES
UROBILINOGEN UR QL STRIP.AUTO: 0.2 E.U./DL
VENTRICULAR RATE: 67 BPM
WBC # BLD AUTO: 8.41 THOUSAND/UL (ref 4.31–10.16)

## 2020-01-27 PROCEDURE — 86901 BLOOD TYPING SEROLOGIC RH(D): CPT

## 2020-01-27 PROCEDURE — 80053 COMPREHEN METABOLIC PANEL: CPT

## 2020-01-27 PROCEDURE — 71046 X-RAY EXAM CHEST 2 VIEWS: CPT

## 2020-01-27 PROCEDURE — 87086 URINE CULTURE/COLONY COUNT: CPT

## 2020-01-27 PROCEDURE — 93005 ELECTROCARDIOGRAM TRACING: CPT

## 2020-01-27 PROCEDURE — 36415 COLL VENOUS BLD VENIPUNCTURE: CPT

## 2020-01-27 PROCEDURE — 86900 BLOOD TYPING SEROLOGIC ABO: CPT

## 2020-01-27 PROCEDURE — 85025 COMPLETE CBC W/AUTO DIFF WBC: CPT

## 2020-01-27 PROCEDURE — 86850 RBC ANTIBODY SCREEN: CPT

## 2020-01-27 PROCEDURE — 81003 URINALYSIS AUTO W/O SCOPE: CPT | Performed by: ORTHOPAEDIC SURGERY

## 2020-01-27 PROCEDURE — 93010 ELECTROCARDIOGRAM REPORT: CPT | Performed by: INTERNAL MEDICINE

## 2020-01-27 RX ORDER — MELATONIN
1000 DAILY
COMMUNITY
End: 2020-02-22 | Stop reason: HOSPADM

## 2020-01-27 NOTE — ANESTHESIA PREPROCEDURE EVALUATION
Review of Systems/Medical History  Patient summary reviewed  Chart reviewed      Cardiovascular  Hyperlipidemia, Dysrhythmias (controlled VR) , atrial fibrillation,    Pulmonary  Negative pulmonary ROS        GI/Hepatic  Negative GI/hepatic ROS          No genitourinary malignancy , Prostatic disorder (radiation 2017), history of prostate cancer       Endo/Other     GYN       Hematology    Coagulation disorder (Eliquis--to see Cardiology) currently taking oral anticoagulants,    Musculoskeletal  Negative musculoskeletal ROS   Arthritis     Neurology  Negative neurology ROS      Psychology   Negative psychology ROS              Physical Exam    Airway    Mallampati score: II  TM Distance: >3 FB  Neck ROM: full     Dental   No notable dental hx implants,     Cardiovascular  Rhythm: irregular, Rate: normal, Cardiovascular exam normal    Pulmonary  Pulmonary exam normal Breath sounds clear to auscultation,     Other Findings        Anesthesia Plan  ASA Score- 2     Anesthesia Type- spinal with ASA Monitors  Additional Monitors:   Airway Plan:         Plan Factors-    Induction- intravenous  Postoperative Plan-     Informed Consent- Anesthetic plan and risks discussed with patient and spouse

## 2020-01-27 NOTE — PERIOPERATIVE NURSING NOTE
Patient given and instructed in use of incentive spirometer- able to attain 2500 ml level with return demonstrations x 3

## 2020-01-27 NOTE — PRE-PROCEDURE INSTRUCTIONS
Pre-Surgery Instructions:   Medication Instructions    apixaban (ELIQUIS) 2 5 mg Patient was instructed by Physician and understands   atorvastatin (LIPITOR) 20 mg tablet Patient was instructed by Physician and understands   cholecalciferol (VITAMIN D3) 1,000 units tablet Patient was instructed by Physician and understands   DELZICOL 400 MG Patient was instructed by Physician and understands   diltiazem (CARDIZEM CD) 180 mg 24 hr capsule Patient was instructed by Physician and understands   Multiple Vitamins-Minerals (MULTIVITAMIN WITH MINERALS) tablet Patient was instructed by Physician and understands   tamsulosin (FLOMAX) 0 4 mg Patient was instructed by Physician and understands  Seen by Dr Ernie Jarrell  Instructed to take Cardizem with sip of water the morning of surgery  No aspirin, NSAIDs, vitamins, or supplements 1 week before surgery  Has instructions from Dr Vargas Ojai when to stop Eliquis before surgery

## 2020-01-29 LAB — BACTERIA UR CULT: NORMAL

## 2020-02-11 ENCOUNTER — TELEPHONE (OUTPATIENT)
Dept: CARDIOLOGY CLINIC | Facility: CLINIC | Age: 80
End: 2020-02-11

## 2020-02-11 NOTE — TELEPHONE ENCOUNTER
Patient referred by the heart care group for [facility], he is schedule at [facility] on 02/17/2020 will be perform by Dr Pepe Lara  Patient wife aware of general instructions and medications holds (Eliquis)  Patient wife verified he is cover under medicare A+B

## 2020-02-14 ENCOUNTER — TELEPHONE (OUTPATIENT)
Dept: SURGERY | Facility: HOSPITAL | Age: 80
End: 2020-02-14

## 2020-02-14 RX ORDER — ASPIRIN 325 MG
325 TABLET ORAL ONCE
Status: CANCELLED | OUTPATIENT
Start: 2020-02-17

## 2020-02-14 RX ORDER — SODIUM CHLORIDE 9 MG/ML
100 INJECTION, SOLUTION INTRAVENOUS ONCE
Status: CANCELLED | OUTPATIENT
Start: 2020-02-17

## 2020-02-17 ENCOUNTER — HOSPITAL ENCOUNTER (INPATIENT)
Facility: HOSPITAL | Age: 80
LOS: 2 days | Discharge: HOME HEALTH | DRG: 470 | End: 2020-02-22
Attending: ORTHOPAEDIC SURGERY | Admitting: ORTHOPAEDIC SURGERY
Payer: MEDICARE

## 2020-02-17 ENCOUNTER — HOSPITAL ENCOUNTER (OUTPATIENT)
Dept: NON INVASIVE DIAGNOSTICS | Facility: HOSPITAL | Age: 80
Discharge: HOME | DRG: 470 | End: 2020-02-17
Attending: INTERNAL MEDICINE | Admitting: INTERNAL MEDICINE
Payer: MEDICARE

## 2020-02-17 VITALS
OXYGEN SATURATION: 98 % | DIASTOLIC BLOOD PRESSURE: 53 MMHG | TEMPERATURE: 97.8 F | HEIGHT: 72 IN | SYSTOLIC BLOOD PRESSURE: 99 MMHG | WEIGHT: 195 LBS | RESPIRATION RATE: 15 BRPM | HEART RATE: 42 BPM | BODY MASS INDEX: 26.41 KG/M2

## 2020-02-17 DIAGNOSIS — M16.12 PRIMARY LOCALIZED OSTEOARTHRITIS OF LEFT HIP: Primary | ICD-10-CM

## 2020-02-17 DIAGNOSIS — R94.39 ABNORMAL STRESS TEST: ICD-10-CM

## 2020-02-17 DIAGNOSIS — D62 POSTOPERATIVE ANEMIA DUE TO ACUTE BLOOD LOSS: ICD-10-CM

## 2020-02-17 LAB
ATRIAL RATE: 91 BPM
QRS AXIS: 9 DEGREES
QRSD INTERVAL: 80 MS
QT INTERVAL: 402 MS
QTC INTERVAL: 440 MS
T WAVE AXIS: 5 DEGREES
VENTRICULAR RATE: 72 BPM

## 2020-02-17 PROCEDURE — 93010 ELECTROCARDIOGRAM REPORT: CPT | Performed by: INTERNAL MEDICINE

## 2020-02-17 PROCEDURE — C1894 INTRO/SHEATH, NON-LASER: HCPCS | Performed by: INTERNAL MEDICINE

## 2020-02-17 PROCEDURE — 93454 CORONARY ARTERY ANGIO S&I: CPT | Performed by: INTERNAL MEDICINE

## 2020-02-17 PROCEDURE — 99152 MOD SED SAME PHYS/QHP 5/>YRS: CPT | Performed by: INTERNAL MEDICINE

## 2020-02-17 PROCEDURE — C1769 GUIDE WIRE: HCPCS | Performed by: INTERNAL MEDICINE

## 2020-02-17 PROCEDURE — 93005 ELECTROCARDIOGRAM TRACING: CPT

## 2020-02-17 PROCEDURE — 99153 MOD SED SAME PHYS/QHP EA: CPT | Performed by: INTERNAL MEDICINE

## 2020-02-17 PROCEDURE — B210YZZ FLUOROSCOPY OF SINGLE CORONARY ARTERY USING OTHER CONTRAST: ICD-10-PCS | Performed by: INTERNAL MEDICINE

## 2020-02-17 RX ORDER — MIDAZOLAM HYDROCHLORIDE 2 MG/2ML
INJECTION, SOLUTION INTRAMUSCULAR; INTRAVENOUS CODE/TRAUMA/SEDATION MEDICATION
Status: COMPLETED | OUTPATIENT
Start: 2020-02-17 | End: 2020-02-17

## 2020-02-17 RX ORDER — VERAPAMIL HYDROCHLORIDE 2.5 MG/ML
INJECTION, SOLUTION INTRAVENOUS CODE/TRAUMA/SEDATION MEDICATION
Status: COMPLETED | OUTPATIENT
Start: 2020-02-17 | End: 2020-02-17

## 2020-02-17 RX ORDER — SODIUM CHLORIDE 9 MG/ML
100 INJECTION, SOLUTION INTRAVENOUS ONCE
Status: COMPLETED | OUTPATIENT
Start: 2020-02-17 | End: 2020-02-17

## 2020-02-17 RX ORDER — HEPARIN SODIUM 1000 [USP'U]/ML
INJECTION, SOLUTION INTRAVENOUS; SUBCUTANEOUS CODE/TRAUMA/SEDATION MEDICATION
Status: COMPLETED | OUTPATIENT
Start: 2020-02-17 | End: 2020-02-17

## 2020-02-17 RX ORDER — ASPIRIN 325 MG
325 TABLET ORAL ONCE
Status: COMPLETED | OUTPATIENT
Start: 2020-02-17 | End: 2020-02-17

## 2020-02-17 RX ORDER — NITROGLYCERIN 20 MG/100ML
INJECTION INTRAVENOUS CODE/TRAUMA/SEDATION MEDICATION
Status: COMPLETED | OUTPATIENT
Start: 2020-02-17 | End: 2020-02-17

## 2020-02-17 RX ORDER — FENTANYL CITRATE 50 UG/ML
INJECTION, SOLUTION INTRAMUSCULAR; INTRAVENOUS CODE/TRAUMA/SEDATION MEDICATION
Status: COMPLETED | OUTPATIENT
Start: 2020-02-17 | End: 2020-02-17

## 2020-02-17 RX ORDER — LIDOCAINE HYDROCHLORIDE 10 MG/ML
INJECTION, SOLUTION EPIDURAL; INFILTRATION; INTRACAUDAL; PERINEURAL CODE/TRAUMA/SEDATION MEDICATION
Status: COMPLETED | OUTPATIENT
Start: 2020-02-17 | End: 2020-02-17

## 2020-02-17 RX ORDER — SODIUM CHLORIDE 9 MG/ML
125 INJECTION, SOLUTION INTRAVENOUS CONTINUOUS
Status: DISPENSED | OUTPATIENT
Start: 2020-02-17 | End: 2020-02-17

## 2020-02-17 RX ADMIN — NITROGLYCERIN 200 MCG: 20 INJECTION INTRAVENOUS at 09:56

## 2020-02-17 RX ADMIN — MIDAZOLAM 2 MG: 1 INJECTION INTRAMUSCULAR; INTRAVENOUS at 09:48

## 2020-02-17 RX ADMIN — HEPARIN SODIUM 4000 UNITS: 1000 INJECTION INTRAVENOUS; SUBCUTANEOUS at 09:56

## 2020-02-17 RX ADMIN — IOHEXOL 40 ML: 350 INJECTION, SOLUTION INTRAVENOUS at 10:00

## 2020-02-17 RX ADMIN — SODIUM CHLORIDE 100 ML/HR: 0.9 INJECTION, SOLUTION INTRAVENOUS at 08:28

## 2020-02-17 RX ADMIN — VERAPAMIL HYDROCHLORIDE 2.5 MG: 2.5 INJECTION INTRAVENOUS at 09:56

## 2020-02-17 RX ADMIN — ASPIRIN 325 MG ORAL TABLET 325 MG: 325 PILL ORAL at 08:55

## 2020-02-17 RX ADMIN — SODIUM CHLORIDE 125 ML/HR: 0.9 INJECTION, SOLUTION INTRAVENOUS at 11:27

## 2020-02-17 RX ADMIN — FENTANYL CITRATE 50 MCG: 50 INJECTION, SOLUTION INTRAMUSCULAR; INTRAVENOUS at 09:48

## 2020-02-17 RX ADMIN — LIDOCAINE HYDROCHLORIDE 1 ML: 10 INJECTION, SOLUTION EPIDURAL; INFILTRATION; INTRACAUDAL; PERINEURAL at 09:53

## 2020-02-17 NOTE — BRIEF OP NOTE (RAD/CATH)
Right radial cath    Findings: Left dominant, few luminal irregularities  No significant obstructive epicardial atherosclerotic CAD

## 2020-02-17 NOTE — H&P
HISTORY AND PHYSICAL - Cardiology   Pablito Ac 67 y o  male MRN: [id number]  Unit/Bed#:  Encounter: [id number]      Assessment:  1  Referral for cardiac cath from [facility] for abnormal stress ordered before ortho surgery with report of apical ischemia  Plan: For cardiac cath    History of Present Illness     HPI: Pablito Ac is a 67y o  year old male who presents for elective cardiac cath prior to orthopedic surgery  Review of Systems:  Review of Systems    14 systems reviewed and negative with the exception of the above and the following        No current facility-administered medications for this encounter           Historical Information   Past Medical History:   Diagnosis Date    Arthritis     Atrial fibrillation ([address] )     Cancer (HCC)     prostate    Dental crowns present     Elevated prostate specific antigen (PSA)     Frequency of micturition     History of pleurisy 1974    History of pneumonia     several years ago    History of prostate cancer     History of radiation therapy     final treatment May 2017    History of rectal bleeding     s/p radiation    [place] (hard of hearing)     bilat w hearing aids    Hypercholesterolemia     Hyperlipidemia     Left thigh pain     Nocturia     Pneumonia     Right hip pain     Unspecified osteoarthritis, unspecified site     Unsteady gait     Wears glasses     Wears hearing aid in both ears      Past Surgical History:   Procedure Laterality Date    CARPAL TUNNEL RELEASE Bilateral     COLONOSCOPY      FOOT SURGERY Right     HEMORRHOID SURGERY      VA TOTAL HIP ARTHROPLASTY Right 11/29/2017    Procedure: ARTHROPLASTY HIP TOTAL ANTERIOR;  Surgeon: Mino Galaviz MD;  Location[facility];  Service: Orthopedics    TONSILLECTOMY      TRANSURETHRAL NEEDLE ABLATION OF THE PROSTATE       Social History     Substance and Sexual Activity   Alcohol Use Yes    Alcohol/week: 7 0 standard drinks    Types: 7 Glasses of wine per week    Comment: wine daily with a meal or so     Social History     Substance and Sexual Activity   Drug Use No     Social History     Tobacco Use   Smoking Status Former Smoker    Types: Pipe    Last attempt to quit: 1986    Years since quittin 1   Smokeless Tobacco Never Used     Family History:   Family History   Problem Relation Age of Onset    Breast cancer Mother     Dementia Father     Other Father         PARKINSON'S DISEASE       Meds/Allergies         No Known Allergies    Objective   Vitals: Blood pressure 115/55, pulse 80, temperature 97 8 °F (36 6 °C), temperature source Tympanic, resp  rate 16, height 6' (1 829 m), weight 88 5 kg (195 lb), SpO2 97 %  , Body mass index is 26 45 kg/m² , Orthostatic Blood Pressures      Most Recent Value   Blood Pressure  115/55 filed at 2020 5039          No intake or output data in the 24 hours ending 20 0941    Invasive Devices     Peripheral Intravenous Line            Peripheral IV 20 Left Wrist less than 1 day                    Physical Exam:  Physical Exam    Gen: No acute distress  HEENT: anicteric, mucous membranes moist  Neck: supple, no jugular venous distention, or carotid bruit  Heart: regular, normal s1 and s2, no murmur/rub or gallop  Lungs :clear to auscultation bilaterally, no rales/rhonchi or wheeze  Abdomen: soft nontender, normoactive bowel sounds, no organomegaly  Ext: warm and perfused, normal femoral pulses, no edema, clubbing  Skin: warm, no rashes  Neuro: AAO x 3, no focal findings  Psychiatric: normal affect  Musculoskeletal: no obvious joint deformities      Lab Results:           Invalid input(s): LABGLOM                No results found for: CHOL  No results found for: HDL  No results found for: LDLCALC  No results found for: TRIG    Lab Results   Component Value Date    ALT 7 (L) 2020    ALT 23 10/31/2017    AST 14 2020    AST 17 10/31/2017             No results found for: NTBNP    No results found for: HGBA1C

## 2020-02-17 NOTE — DISCHARGE INSTRUCTIONS
After Radial Heart Catheterization   WHAT YOU NEED TO KNOW:   What will happen after a radial heart catheterization? · You will be attached to a heart monitor until you are fully awake  A heart monitor is an EKG that stays on continuously to record your heart's electrical activity  Healthcare providers will monitor your vital signs and pulses in your arm  They will frequently check your pressure bandage for bleeding or swelling  · You may have a band wrapped tightly around your wrist  The band puts pressure on your wound and helps prevent bleeding  A healthcare provider can put air into the band or remove air from the band  A healthcare provider will gradually remove air from the band and decrease pressure on your wrist  The band may be removed in 2 hours or when your wound stops bleeding  · You will need to keep your wrist straight for 2 to 4 hours  Do not  push or pull with your arm  Arm movements can cause serious bleeding  After you are monitored for several hours, you may go home or may need to stay in the hospital overnight  What do I need to know before I go home? · Care for your wound as directed  Remove the pressure bandage in 24 hours or as directed  Mild bruising is normal and expected  A small bandage can be placed on your wound after you remove the pressure bandage  Do not put powders, lotions, or creams on your wound  They may cause your wound to get infected  Monitor your wound every day for signs of infection, such as redness, swelling, or pus  · Shower the day after your procedure or as directed  Remove your pressure bandage before you shower  Do not take baths or go in hot tubs or pools  Carefully wash the wound with soap and water  Pat the area dry  A small bandage can be placed on your wound after you shower  · Apply firm, steady pressure to your wound if it bleeds  Apply pressure with a clean gauze or towel for 5 to 10 minutes   Call 911 if bleeding becomes heavy or does not stop  · Drink liquids as directed  Liquids will help flush the contrast liquid from your body  Ask how much liquid to drink each day and which liquids are best for you  · Do not lift anything heavier than 5 pounds until directed by your healthcare provider  Heavy lifting can put stress on your wound and cause bleeding  Do not push or pull with the arm that was used for the procedure  Do not do vigorous activity for at least 48 hours  Vigorous activity may cause bleeding from your wound  Rest and do quiet activities  Take short walks around the house to prevent a blood clot  Ask your healthcare provider when you can return to your normal activities  · Do not drive or return to work until your healthcare provider says it is okay  Your healthcare provider may tell you to wait 48 hours before you drive to decrease your risk for bleeding  You may not be able to return to work for at least 2 days after your procedure if your job involves heavy lifting  What medicines may I need? You may need any of the following:  · Blood thinners    help prevent blood clots  Examples of blood thinners include heparin and warfarin  Clots can cause strokes, heart attacks, and death  The following are general safety guidelines to follow while you are taking a blood thinner:    ¨ Watch for bleeding and bruising while you take blood thinners  Watch for bleeding from your gums or nose  Watch for blood in your urine and bowel movements  Use a soft washcloth on your skin, and a soft toothbrush to brush your teeth  This can keep your skin and gums from bleeding  If you shave, use an electric shaver  Do not play contact sports  ¨ Tell your dentist and other healthcare providers that you take anticoagulants  Wear a bracelet or necklace that says you take this medicine  ¨ Do not start or stop any medicines unless your healthcare provider tells you to  Many medicines cannot be used with blood thinners       ¨ Tell your healthcare provider right away if you forget to take the medicine, or if you take too much  ¨ Warfarin  is a blood thinner that you may need to take  The following are things you should be aware of if you take warfarin  § Foods and medicines can affect the amount of warfarin in your blood  Do not make major changes to your diet while you take warfarin  Warfarin works best when you eat about the same amount of vitamin K every day  Vitamin K is found in green leafy vegetables and certain other foods  Ask for more information about what to eat when you are taking warfarin  § You will need to see your healthcare provider for follow-up visits when you are on warfarin  You will need regular blood tests  These tests are used to decide how much medicine you need  · Acetaminophen  helps decrease pain and fever  This medicine is available without a doctor's order  Ask how much medicine is safe to take, and how often to take it  Acetaminophen can cause liver damage if not taken correctly  · Take your medicine as directed  Contact your healthcare provider if you think your medicine is not helping or if you have side effects  Tell him or her if you are allergic to any medicine  Keep a list of the medicines, vitamins, and herbs you take  Include the amounts, and when and why you take them  Bring the list or the pill bottles to follow-up visits  Carry your medicine list with you in case of an emergency    Call 911 for any of the following:   · You have any of the following signs of a heart attack:      ¨ Squeezing, pressure, or pain in your chest that lasts longer than 5 minutes or returns    ¨ Discomfort or pain in your back, neck, jaw, stomach, or arm     ¨ Trouble breathing    ¨ Nausea or vomiting    ¨ Lightheadedness or a sudden cold sweat, especially with chest pain or trouble breathing    · You have any of the following signs of a stroke:      ¨ Numbness or drooping on one side of your face     ¨ Weakness in an arm or leg    ¨ Confusion or difficulty speaking    ¨ Dizziness, a severe headache, or vision loss    · You feel lightheaded, short of breath, and have chest pain  · You cough up blood  · You have trouble breathing  · You cannot stop the bleeding from your wound even after you hold firm pressure for 10 minutes  When should I seek immediate care? · Blood soaks through your bandage  · Your stitches come apart  · Your hand or arm feels numb, cool, or looks pale  · Your wound gets swollen quickly  When should I contact my healthcare provider? · You have a fever or chills  · Your wound is red, swollen, or draining pus  · Your wound looks more bruised or you have new bruising on the side of your wrist      · You have nausea or are vomiting  · Your skin is itchy, swollen, or you have a rash  · You have questions or concerns about your condition or care  CARE AGREEMENT:   You have the right to help plan your care  Learn about your health condition and how it may be treated  Discuss treatment options with your caregivers to decide what care you want to receive  You always have the right to refuse treatment  The above information is an  only  It is not intended as medical advice for individual conditions or treatments  Talk to your doctor, nurse or pharmacist before following any medical regimen to see if it is safe and effective for you  © 2017 [employer] Information is for End User's use only and may not be sold, redistributed or otherwise used for commercial purposes  All illustrations and images included in CareNotes® are the copyrighted property of [employer]  or [employer] Borja

## 2020-02-17 NOTE — PROGRESS NOTES
D/C instructions reviewed w/ pt & spouse, reminded to limit use R arm for 24 hrs  Air removal from TR Band without complications  Pt given extra bandaides for home use  PO fluids encouraged  Denies discomfort  Neurovascular check intact R hand +2 pulse, fingers pink & warm, able to wiggle

## 2020-02-18 RX ORDER — VANCOMYCIN HYDROCHLORIDE 1 G/200ML
1000 INJECTION, SOLUTION INTRAVENOUS
Status: CANCELLED | OUTPATIENT
Start: 2020-02-18

## 2020-02-19 ENCOUNTER — APPOINTMENT (OUTPATIENT)
Dept: RADIOLOGY | Facility: HOSPITAL | Age: 80
DRG: 470 | End: 2020-02-19
Payer: MEDICARE

## 2020-02-19 ENCOUNTER — HOSPITAL ENCOUNTER (OUTPATIENT)
Dept: RADIOLOGY | Facility: HOSPITAL | Age: 80
Setting detail: OUTPATIENT SURGERY
Discharge: HOME | DRG: 470 | End: 2020-02-19
Payer: MEDICARE

## 2020-02-19 ENCOUNTER — ANESTHESIA (OUTPATIENT)
Dept: PERIOP | Facility: HOSPITAL | Age: 80
DRG: 470 | End: 2020-02-19
Payer: MEDICARE

## 2020-02-19 DIAGNOSIS — M16.12 UNILATERAL PRIMARY OSTEOARTHRITIS, LEFT HIP: ICD-10-CM

## 2020-02-19 PROCEDURE — C1776 JOINT DEVICE (IMPLANTABLE): HCPCS | Performed by: ORTHOPAEDIC SURGERY

## 2020-02-19 PROCEDURE — 73501 X-RAY EXAM HIP UNI 1 VIEW: CPT

## 2020-02-19 PROCEDURE — C1713 ANCHOR/SCREW BN/BN,TIS/BN: HCPCS | Performed by: ORTHOPAEDIC SURGERY

## 2020-02-19 PROCEDURE — 97163 PT EVAL HIGH COMPLEX 45 MIN: CPT | Performed by: PHYSICAL THERAPIST

## 2020-02-19 PROCEDURE — 0SRB04A REPLACEMENT OF LEFT HIP JOINT WITH CERAMIC ON POLYETHYLENE SYNTHETIC SUBSTITUTE, UNCEMENTED, OPEN APPROACH: ICD-10-PCS | Performed by: ORTHOPAEDIC SURGERY

## 2020-02-19 DEVICE — M-SPEC METAL FEMORAL HEAD 12/14 TAPER DIAMETER 36MM +8.5: Type: IMPLANTABLE DEVICE | Site: HIP | Status: FUNCTIONAL

## 2020-02-19 DEVICE — PINNACLE HIP SOLUTIONS ALTRX POLYETHYLENE ACETABULAR LINER NEUTRAL 36MM ID 54MM OD
Type: IMPLANTABLE DEVICE | Site: HIP | Status: FUNCTIONAL
Brand: PINNACLE ALTRX

## 2020-02-19 DEVICE — CORAIL HIP SYSTEM CEMENTLESS FEMORAL STEM 12/14 AMT 135 DEGREES KA SIZE 10 HA COATED STANDARD COLLAR
Type: IMPLANTABLE DEVICE | Site: HIP | Status: FUNCTIONAL
Brand: CORAIL

## 2020-02-19 DEVICE — PINNACLE POROCOAT ACETABULAR SHELL SECTOR II 54MM OD
Type: IMPLANTABLE DEVICE | Site: HIP | Status: FUNCTIONAL
Brand: PINNACLE POROCOAT

## 2020-02-19 DEVICE — PINNACLE CANCELLOUS BONE SCREW 6.5MM X 20MM
Type: IMPLANTABLE DEVICE | Site: HIP | Status: FUNCTIONAL
Brand: PINNACLE

## 2020-02-19 RX ORDER — CELECOXIB 200 MG/1
200 CAPSULE ORAL DAILY
Status: DISCONTINUED | OUTPATIENT
Start: 2020-02-19 | End: 2020-02-22 | Stop reason: HOSPADM

## 2020-02-19 RX ORDER — DILTIAZEM HYDROCHLORIDE 180 MG/1
180 CAPSULE, COATED, EXTENDED RELEASE ORAL DAILY
Status: DISCONTINUED | OUTPATIENT
Start: 2020-02-20 | End: 2020-02-22 | Stop reason: HOSPADM

## 2020-02-19 RX ORDER — HYDROMORPHONE HCL/PF 1 MG/ML
0.5 SYRINGE (ML) INJECTION
Status: DISCONTINUED | OUTPATIENT
Start: 2020-02-19 | End: 2020-02-19 | Stop reason: HOSPADM

## 2020-02-19 RX ORDER — SENNOSIDES 8.6 MG
1 TABLET ORAL DAILY
Status: DISCONTINUED | OUTPATIENT
Start: 2020-02-19 | End: 2020-02-22 | Stop reason: HOSPADM

## 2020-02-19 RX ORDER — CEFAZOLIN SODIUM 1 G/50ML
1000 SOLUTION INTRAVENOUS EVERY 8 HOURS
Status: COMPLETED | OUTPATIENT
Start: 2020-02-19 | End: 2020-02-20

## 2020-02-19 RX ORDER — SODIUM CHLORIDE, SODIUM LACTATE, POTASSIUM CHLORIDE, CALCIUM CHLORIDE 600; 310; 30; 20 MG/100ML; MG/100ML; MG/100ML; MG/100ML
125 INJECTION, SOLUTION INTRAVENOUS CONTINUOUS
Status: DISCONTINUED | OUTPATIENT
Start: 2020-02-19 | End: 2020-02-22 | Stop reason: HOSPADM

## 2020-02-19 RX ORDER — ONDANSETRON 2 MG/ML
4 INJECTION INTRAMUSCULAR; INTRAVENOUS EVERY 6 HOURS PRN
Status: DISCONTINUED | OUTPATIENT
Start: 2020-02-19 | End: 2020-02-22 | Stop reason: HOSPADM

## 2020-02-19 RX ORDER — OXYCODONE HYDROCHLORIDE 5 MG/1
5 TABLET ORAL EVERY 4 HOURS PRN
Status: DISCONTINUED | OUTPATIENT
Start: 2020-02-19 | End: 2020-02-22 | Stop reason: HOSPADM

## 2020-02-19 RX ORDER — CELECOXIB 200 MG/1
200 CAPSULE ORAL DAILY
Status: DISCONTINUED | OUTPATIENT
Start: 2020-02-19 | End: 2020-02-19 | Stop reason: SDUPTHER

## 2020-02-19 RX ORDER — TAMSULOSIN HYDROCHLORIDE 0.4 MG/1
0.4 CAPSULE ORAL
Status: DISCONTINUED | OUTPATIENT
Start: 2020-02-19 | End: 2020-02-22 | Stop reason: HOSPADM

## 2020-02-19 RX ORDER — PROPOFOL 10 MG/ML
INJECTION, EMULSION INTRAVENOUS CONTINUOUS PRN
Status: DISCONTINUED | OUTPATIENT
Start: 2020-02-19 | End: 2020-02-19 | Stop reason: SURG

## 2020-02-19 RX ORDER — ATORVASTATIN CALCIUM 20 MG/1
20 TABLET, FILM COATED ORAL 3 TIMES WEEKLY
Status: DISCONTINUED | OUTPATIENT
Start: 2020-02-19 | End: 2020-02-22 | Stop reason: HOSPADM

## 2020-02-19 RX ORDER — TRAMADOL HYDROCHLORIDE 50 MG/1
50 TABLET ORAL EVERY 6 HOURS PRN
Status: DISCONTINUED | OUTPATIENT
Start: 2020-02-19 | End: 2020-02-22 | Stop reason: HOSPADM

## 2020-02-19 RX ORDER — BUPIVACAINE HYDROCHLORIDE 7.5 MG/ML
INJECTION, SOLUTION INTRASPINAL AS NEEDED
Status: DISCONTINUED | OUTPATIENT
Start: 2020-02-19 | End: 2020-02-19 | Stop reason: SURG

## 2020-02-19 RX ORDER — MEPERIDINE HYDROCHLORIDE 50 MG/ML
12.5 INJECTION INTRAMUSCULAR; INTRAVENOUS; SUBCUTANEOUS ONCE AS NEEDED
Status: DISCONTINUED | OUTPATIENT
Start: 2020-02-19 | End: 2020-02-19 | Stop reason: HOSPADM

## 2020-02-19 RX ORDER — VANCOMYCIN HYDROCHLORIDE 1 G/200ML
1000 INJECTION, SOLUTION INTRAVENOUS ONCE
Status: COMPLETED | OUTPATIENT
Start: 2020-02-19 | End: 2020-02-19

## 2020-02-19 RX ORDER — FENTANYL CITRATE/PF 50 MCG/ML
50 SYRINGE (ML) INJECTION
Status: DISCONTINUED | OUTPATIENT
Start: 2020-02-19 | End: 2020-02-19 | Stop reason: HOSPADM

## 2020-02-19 RX ORDER — ACETAMINOPHEN 325 MG/1
650 TABLET ORAL EVERY 6 HOURS PRN
Status: DISCONTINUED | OUTPATIENT
Start: 2020-02-19 | End: 2020-02-22 | Stop reason: HOSPADM

## 2020-02-19 RX ORDER — BUPIVACAINE HYDROCHLORIDE AND EPINEPHRINE 5; 5 MG/ML; UG/ML
INJECTION, SOLUTION PERINEURAL AS NEEDED
Status: DISCONTINUED | OUTPATIENT
Start: 2020-02-19 | End: 2020-02-19 | Stop reason: HOSPADM

## 2020-02-19 RX ORDER — SIMETHICONE 80 MG
80 TABLET,CHEWABLE ORAL 4 TIMES DAILY PRN
Status: DISCONTINUED | OUTPATIENT
Start: 2020-02-19 | End: 2020-02-22 | Stop reason: HOSPADM

## 2020-02-19 RX ORDER — OXYCODONE HYDROCHLORIDE 10 MG/1
10 TABLET ORAL EVERY 4 HOURS PRN
Status: DISCONTINUED | OUTPATIENT
Start: 2020-02-19 | End: 2020-02-22 | Stop reason: HOSPADM

## 2020-02-19 RX ORDER — CALCIUM CARBONATE 200(500)MG
1000 TABLET,CHEWABLE ORAL DAILY PRN
Status: DISCONTINUED | OUTPATIENT
Start: 2020-02-19 | End: 2020-02-22 | Stop reason: HOSPADM

## 2020-02-19 RX ORDER — ONDANSETRON 2 MG/ML
4 INJECTION INTRAMUSCULAR; INTRAVENOUS ONCE AS NEEDED
Status: DISCONTINUED | OUTPATIENT
Start: 2020-02-19 | End: 2020-02-19 | Stop reason: HOSPADM

## 2020-02-19 RX ORDER — METHOCARBAMOL 500 MG/1
500 TABLET, FILM COATED ORAL EVERY 6 HOURS PRN
Status: DISCONTINUED | OUTPATIENT
Start: 2020-02-19 | End: 2020-02-22 | Stop reason: HOSPADM

## 2020-02-19 RX ORDER — PANTOPRAZOLE SODIUM 40 MG/1
40 TABLET, DELAYED RELEASE ORAL
Status: DISCONTINUED | OUTPATIENT
Start: 2020-02-19 | End: 2020-02-22 | Stop reason: HOSPADM

## 2020-02-19 RX ORDER — MAGNESIUM HYDROXIDE 1200 MG/15ML
LIQUID ORAL AS NEEDED
Status: DISCONTINUED | OUTPATIENT
Start: 2020-02-19 | End: 2020-02-19 | Stop reason: HOSPADM

## 2020-02-19 RX ORDER — PROPOFOL 10 MG/ML
INJECTION, EMULSION INTRAVENOUS AS NEEDED
Status: DISCONTINUED | OUTPATIENT
Start: 2020-02-19 | End: 2020-02-19 | Stop reason: SURG

## 2020-02-19 RX ORDER — HYDROMORPHONE HCL/PF 1 MG/ML
0.5 SYRINGE (ML) INJECTION EVERY 2 HOUR PRN
Status: ACTIVE | OUTPATIENT
Start: 2020-02-19 | End: 2020-02-21

## 2020-02-19 RX ORDER — SODIUM CHLORIDE 9 MG/ML
125 INJECTION, SOLUTION INTRAVENOUS CONTINUOUS
Status: DISCONTINUED | OUTPATIENT
Start: 2020-02-19 | End: 2020-02-22 | Stop reason: HOSPADM

## 2020-02-19 RX ADMIN — TRANEXAMIC ACID 1000 MG: 1 INJECTION, SOLUTION INTRAVENOUS at 10:36

## 2020-02-19 RX ADMIN — VANCOMYCIN HYDROCHLORIDE 1000 MG: 1 INJECTION, SOLUTION INTRAVENOUS at 10:20

## 2020-02-19 RX ADMIN — PROPOFOL 50 MG: 10 INJECTION, EMULSION INTRAVENOUS at 10:36

## 2020-02-19 RX ADMIN — TRANEXAMIC ACID 1000 MG: 1 INJECTION, SOLUTION INTRAVENOUS at 12:18

## 2020-02-19 RX ADMIN — SODIUM CHLORIDE: 0.9 INJECTION, SOLUTION INTRAVENOUS at 11:00

## 2020-02-19 RX ADMIN — SODIUM CHLORIDE: 0.9 INJECTION, SOLUTION INTRAVENOUS at 11:41

## 2020-02-19 RX ADMIN — OXYCODONE HYDROCHLORIDE 10 MG: 10 TABLET ORAL at 20:06

## 2020-02-19 RX ADMIN — SODIUM CHLORIDE: 0.9 INJECTION, SOLUTION INTRAVENOUS at 12:20

## 2020-02-19 RX ADMIN — SODIUM CHLORIDE 125 ML/HR: 0.9 INJECTION, SOLUTION INTRAVENOUS at 09:29

## 2020-02-19 RX ADMIN — Medication 2000 MG: at 10:20

## 2020-02-19 RX ADMIN — PANTOPRAZOLE SODIUM 40 MG: 40 TABLET, DELAYED RELEASE ORAL at 15:31

## 2020-02-19 RX ADMIN — CEFAZOLIN SODIUM 1000 MG: 1 SOLUTION INTRAVENOUS at 23:36

## 2020-02-19 RX ADMIN — SODIUM CHLORIDE, SODIUM LACTATE, POTASSIUM CHLORIDE, AND CALCIUM CHLORIDE 125 ML/HR: .6; .31; .03; .02 INJECTION, SOLUTION INTRAVENOUS at 13:24

## 2020-02-19 RX ADMIN — TAMSULOSIN HYDROCHLORIDE 0.4 MG: 0.4 CAPSULE ORAL at 15:30

## 2020-02-19 RX ADMIN — ATORVASTATIN CALCIUM 20 MG: 20 TABLET, FILM COATED ORAL at 15:31

## 2020-02-19 RX ADMIN — CEFAZOLIN SODIUM 1000 MG: 1 SOLUTION INTRAVENOUS at 15:31

## 2020-02-19 RX ADMIN — SODIUM CHLORIDE 125 ML/HR: 0.9 INJECTION, SOLUTION INTRAVENOUS at 08:05

## 2020-02-19 RX ADMIN — CELECOXIB 200 MG: 200 CAPSULE ORAL at 15:31

## 2020-02-19 RX ADMIN — PROPOFOL 100 MCG/KG/MIN: 10 INJECTION, EMULSION INTRAVENOUS at 10:37

## 2020-02-19 RX ADMIN — APIXABAN 2.5 MG: 2.5 TABLET, FILM COATED ORAL at 23:36

## 2020-02-19 RX ADMIN — OXYCODONE HYDROCHLORIDE 5 MG: 5 TABLET ORAL at 15:31

## 2020-02-19 RX ADMIN — SENNOSIDES 8.6 MG: 8.6 TABLET, FILM COATED ORAL at 15:30

## 2020-02-19 RX ADMIN — BUPIVACAINE HYDROCHLORIDE IN DEXTROSE 1.7 ML: 7.5 INJECTION, SOLUTION SUBARACHNOID at 10:32

## 2020-02-19 NOTE — PLAN OF CARE
Problem: PHYSICAL THERAPY ADULT  Goal: Performs mobility at highest level of function for planned discharge setting  See evaluation for individualized goals  Description  Treatment/Interventions: Functional transfer training, LE strengthening/ROM, Elevations, Therapeutic exercise, Endurance training, Patient/family training, Equipment eval/education, Bed mobility, Gait training, Compensatory technique education, Spoke to nursing  Equipment Recommended: Walker(has)       See flowsheet documentation for full assessment, interventions and recommendations  Note:   Prognosis: Good  Problem List: Decreased strength, Decreased range of motion, Decreased endurance, Impaired balance, Decreased mobility, Decreased safety awareness, Impaired sensation, Decreased skin integrity, Orthopedic restrictions, Pain  Assessment: pt admitted with severe l hip pain due to djd and failed conservative care  pt referred to PT after T anterior CELESTINO  pt was indep PTA witout assistive device but using cane on ocasion when l hip pain was worse  pt demonstrated mild to moderate functional limitations due to recent L thr  pt has current deficits in strength, rom, balance, gait sequencing and stability, pain control and safety awareness  pt will need skilled PT  pt wa able to mobilize to edge of bed with min assist  transfer to bedside chair wtih min assist and rw wbat lle  pt continued to reort moderate to severe l hip pain  ice applied and pt made comfortable  BP satisfactory  nursing notified  pt will need skilled PT to maximize function to return home  pt desires OPPT at [facility]  has recliner to sleep on firts floor if needed  Barriers to Discharge: None     Recommendation: Home with family support, Outpatient PT     PT - OK to Discharge: No    See flowsheet documentation for full assessment

## 2020-02-19 NOTE — PLAN OF CARE
Problem: PAIN - ADULT  Goal: Verbalizes/displays adequate comfort level or baseline comfort level  Description  Interventions:  - Encourage patient to monitor pain and request assistance  - Assess pain using appropriate pain scale  - Administer analgesics based on type and severity of pain and evaluate response  - Implement non-pharmacological measures as appropriate and evaluate response  - Consider cultural and social influences on pain and pain management  - Notify physician/advanced practitioner if interventions unsuccessful or patient reports new pain  Outcome: Progressing     Problem: SAFETY ADULT  Goal: Patient will remain free of falls  Description  INTERVENTIONS:  - Assess patient frequently for physical needs  -  Identify cognitive and physical deficits and behaviors that affect risk of falls    -  [place] fall precautions as indicated by assessment   - Educate patient/family on patient safety including physical limitations  - Instruct patient to call for assistance with activity based on assessment  - Modify environment to reduce risk of injury  - Consider OT/PT consult to assist with strengthening/mobility  Outcome: Progressing  Goal: Maintain or return to baseline ADL function  Description  INTERVENTIONS:  -  Assess patient's ability to carry out ADLs; assess patient's baseline for ADL function and identify physical deficits which impact ability to perform ADLs (bathing, care of mouth/teeth, toileting, grooming, dressing, etc )  - Assess/evaluate cause of self-care deficits   - Assess range of motion  - Assess patient's mobility; develop plan if impaired  - Assess patient's need for assistive devices and provide as appropriate  - Encourage maximum independence but intervene and supervise when necessary  - Involve family in performance of ADLs  - Assess for home care needs following discharge   - Consider OT consult to assist with ADL evaluation and planning for discharge  - Provide patient education as appropriate  Outcome: Progressing  Goal: Maintain or return mobility status to optimal level  Description  INTERVENTIONS:  - Assess patient's baseline mobility status (ambulation, transfers, stairs, etc )    - Identify cognitive and physical deficits and behaviors that affect mobility  - Identify mobility aids required to assist with transfers and/or ambulation (gait belt, sit-to-stand, lift, walker, cane, etc )  - [place] fall precautions as indicated by assessment  - Record patient progress and toleration of activity level on Mobility SBAR; progress patient to next Phase/Stage  - Instruct patient to call for assistance with activity based on assessment  - Consider rehabilitation consult to assist with strengthening/weightbearing, etc   Outcome: Progressing     Problem: DISCHARGE PLANNING  Goal: Discharge to home or other facility with appropriate resources  Description  INTERVENTIONS:  - Identify barriers to discharge w/patient and caregiver  - Arrange for needed discharge resources and transportation as appropriate  - Identify discharge learning needs (meds, wound care, etc )  - Arrange for interpretive services to assist at discharge as needed  - Refer to Case Management Department for coordinating discharge planning if the patient needs post-hospital services based on physician/advanced practitioner order or complex needs related to functional status, cognitive ability, or social support system  Outcome: Progressing

## 2020-02-19 NOTE — ANESTHESIA PROCEDURE NOTES
Spinal Block    Patient location during procedure: OR  Reason for block: procedure for pain and at surgeon's request  Staffing  Anesthesiologist: Piper Gomez DO  Resident/CRNA: Nato Robin CRNA  Performed: resident/CRNA   Preanesthetic Checklist  Completed: patient identified, site marked, surgical consent, pre-op evaluation, timeout performed, IV checked, risks and benefits discussed and monitors and equipment checked  Spinal Block  Patient position: sitting  Prep: ChloraPrep  Patient monitoring: cardiac monitor and frequent blood pressure checks  Approach: midline  Location: L3-4  Injection technique: single-shot  Needle  Needle type: pencil-tip   Needle gauge: 25 G  Needle length: 10 cm  Assessment  Sensory level: T4  Injection Assessment:  negative aspiration for heme, no paresthesia on injection and positive aspiration for clear CSF    Post-procedure:  adhesive bandage applied, pressure dressing applied, secured with tape, site cleaned and sterile dressing applied

## 2020-02-19 NOTE — INTERVAL H&P NOTE
H&P reviewed  After examining the patient I find no changes in the patients condition since the H&P had been written      Vitals:    01/27/20 1422   BP: 144/62   Pulse: 64   Resp: 14   Temp: 98 6 °F (37 °C)   SpO2: 97%

## 2020-02-19 NOTE — OP NOTE
OPERATIVE REPORT  PATIENT NAME: Shannon Simmons    :  1940  MRN: [id number]  Pt Location: [facility] OR ROOM 02    Operative Note    Shannon Simmons  2020    Pre-op Diagnosis:   Left Hip Osteoarthritis    Post-op Diagnosis:   Left Hip Osteoarthritis    Procedure:  Left Hip Replacement    Surgeon:  Madelin Lyon MD    Assistants:  BIANCA Grover CFA       Anesthesia:  Spinal    Estimated Blood Loss:  300 cc    Components:   Implant Name Type Inv  Item Serial No   Lot No  LRB No  Used   SHELL ACETABULAR 54MM POROUS SOLID LCK RING PINNACLE - [id number]  SHELL ACETABULAR 54MM POROUS SOLID LCK RING PINNACLE  DEPUY [id number] Left 1   SCREW LACEY 6 5 X 20MM SLF TAP PINNACLE - [id number]  SCREW LACEY 6 5 X 20MM SLF TAP PINNACLE  DEPUY [id number] Left 1   LINER ACTB ULT LNK PE 36 X 54MM 0DEG NEUTRAL ALTRX - [id number]  LINER ACTB ULT LNK PE 36 X 54MM 0DEG NEUTRAL ALTRX  DEPUY [id number] Left 1   STEM FEM PRESS FIT 12/ TAPER SZ 10 COLR STD CORAIL - [id number]  STEM FEM PRESS FIT 12/14 TAPER SZ 10 COLR STD CORAIL  DEPUY [id number] Left 1   HEAD FEMORAL CMNTLS 12/ TPR 36MM +5MM BIOLOX DELTA ARTICULEZE - [id number]  HEAD FEMORAL CMNTLS 12/ TPR 36MM +5MM BIOLOX DELTA ARTICULEZE  DEPUY [id number] Left 1   HEAD FEMORAL 12/ TPR 36MM +8 5MM M SPC ARTICULEZE - [id number]  HEAD FEMORAL 12/14 TPR 36MM +8 5MM M SPC ARTICULEZE  DEPUY [id number] Left 1   Note: the 8 5 ball was used, not the 5 ball  APPROACH:   Direct anterior  INDICATION: Shannon Simmons is a 67 y o  male with advanced osteoarthritis of the Left hip, which was refractory to nonoperative therapy  He understood the risks and benefits of hip replacement and wished to proceed  DESCRIPTION OF PROCEDURE: On 2020 the patient was brought to the holding area  The identity and surgical site were confirmed by him and the surgeon  Perioperative intravenous antibiotics were given   Anesthesia was administered by the anesthesia team  The leg length examination was performed with him in the supine position after anesthesia  The operative extremity was shorter by 3-4mm  Then the patient was positioned on the HANA table and preoperative fluoroscopic views were obtained  Then the Left extremity was prepped and draped in the usual sterile manner  A direct anterior approach was performed to the hip in the usual manner, exposing the capsule and doing a capsulotomy  A femoral neck osteotomy was made  The head was removed  Acetabulum was exposed and reamed sequentially and eventually underreamed by 1mm for the impaction of the appropriately sized cup with excellent stability to host bone  This was impacted under direct fluoroscopic guidance to make sure we were satisfied with the position  Also, there was good coverage anteriorly to prevent any impingement  One screw was placed in the posterior-superior quadrant  Osteophytes were removed circumferentially from the acetabulum  Then the polyethylene liner was impacted into the cup and it was down all the way around  Attention was then turned to the femur  Appropriate releases were made  The leg was dropped into hyperextension, external rotation  The canal was accessed without difficulty  It was broached to fit the appropriate size stem, which gave excellent stability within the host bone, both axially and rotationally  We used the reamers distally to prevent any potting  We used the calcar planer as well until we were satisfied with the position of the broach relative to the proximal femur  We trialed  We were able to achieve good stability and good restoration of leg lengths and the stem size and neck were chosen as 10KA (both the standard and KLA neck were trialed; the KLA neck did not achieve stability despite using a longer ball; therefore, to achieve stability, we trialed the KA neck and that was more stable and therefore this was chosen)  Therefore, trial components were removed   The femoral canal was irrigated and the stem was impacted until it reached a firm endpoint, and it had excellent stability within the host bone, both axially and rotationally  We trialed with the different ball options and the 8 5 ball was chosen based on stabililty, measurement of leg lengths by fluoroscopic views, and the soft tissue tension to be appropriate and not excessively lax or tight  With that, there was excellent stability as the hip was able to come to 120 degrees of external rotation, in neutral, it was able to come to 100 degrees of external rotation, 30 degrees of hyperextension, had a firm endpoint posteriorly and the soft tissue tension was appropriate without being excessively lax or tight  Also, the leg length restoration appeared to be appropriate given the fluoroscopic measurements and the preoperative leg length examination  Initially we thought the 5 ball achieved adequate stability, but when we trialed again after implanting the 5 ceramic ball, there was immediate instability in ER; this was not deemed acceptable  Therefore, the ball was removed and the we trialed again with the 5 and 8 5; the 5 now showed clear instability anteriorly and soft tissue laxity but this resolved with the 8 5  Therefore, the 8 5 ball was chosen and impacted with appropriate force onto a clean trunnion with 3 blows of the mallet  The hip was then reduced  It was irrigated copiously with several liters of pulsed lavage  A periarticular injection was injected for postoperative analgesia  The capsulotomy and the fascia were closed with #1 Vicryl suture  The skin was closed using Vicryl sutures, staples, skin adhesive and a sterile dressing  The patient was then awakened and taken to the recovery room  The assistance of Judd Sin PA-C was essential as no qualified resident assistance was available       XRAY REPORT: Postoperative x-rays including an AP view of the Left hip as well as fluoroscopic views of the Left hip taken intraoperatively on 2/19/2020 were checked and revealed a Left hip replacement in good alignment with no fracture or dislocation         Ciara MD Nato     Date: 2/19/2020  Time: 3:06 PM

## 2020-02-19 NOTE — ANESTHESIA POSTPROCEDURE EVALUATION
Post-Op Assessment Note    CV Status:  Stable  Pain Score: 2    Pain management: adequate     Mental Status:  Alert and awake   Hydration Status:  Euvolemic and stable   PONV Controlled:  Controlled   Airway Patency:  Patent   Post Op Vitals Reviewed: Yes      Staff: Anesthesiologist           /75 (02/19/20 1523)    Temp      Pulse 75 (02/19/20 1523)   Resp 19 (02/19/20 1523)    SpO2 99 % (02/19/20 1523)

## 2020-02-19 NOTE — CONSULTS
Consultation - Internal Medicine  Mily Rodriguez 67 y o  male MRN: [id number]  Unit/Bed#: E2 -01 Encounter: [id number]      Impression :-    80-year-old male, status post left hip replacement earlier today by Dr Hu Lane  Advanced DJD left hip, failed conservative treatments  Previous right hip replacement November of 2017  Ambulatory dysfunction  Chronic anticoagulation with Eliquis for atrial fibrillation-follows Dr Lola Mendez  Prostate cancer/postoperative indwelling Durán catheter  Previous radiation therapy 2017 for prostate cancer-follows Dr Martha Noyola  BPH  Hypercholesterolemia  Recent cardiac catheterization-02/17/2020  Hearing impairment uses hearing aids     Recommendation: -    Continue postoperative care as recommended by primary orthopedic service  Patient has been started back on Eliquis as of late heaving today  He will be starting on 2 5 mg b i d  Instead of 5 mg b i d  Regular dose for his atrial fibrillation due to his immediate surgery and to prevent any postsurgical hemorrhage  Patient will need to resume back his regular dose of Eliquis once this risk diminishes and is cleared by orthopedic team   Patient is fully versed and indicates that he had discussion with his cardiologist as outpatient as well as with Dr Hu Lane regarding this  Continue with incentive spirometry and OT, physical therapy and early mobilization  Continue with current analgesics which I reviewed  Monitor patient for any acute blood loss anemia, recheck hemoglobin in the morning  Preferably removed Durán catheter sooner than later hopefully within next 24 hours to minimize risk of UTIs  Postoperative orthopedic hypotension protocol for any low blood pressures  Continue mechanical Venodyne compression boots  We will follow this pleasant patient with your service closely and recommend necessary changes based on  further hospital course and diagnostics  Thank you, Dr Hu Lane , for your kind consultation  HISTORY OF PRESENT ILLNESS:    Reason for Consult:  Post operative management following elective left hip replacement    HPI: Grafton Leventhal is a 67 y o  male who has been followed by orthopedic team as outpatient with regards to his ongoing complains of chronic left hip pain for several years  Pain initially was dull and achy well localized in the groin area but as the time has progressed over few years pain has been more constant particularly over the lateral and posterior aspect of his hip  Pain has been worse with simple activities like climbing up steps and stairs/sitting for prolonged period was also becoming difficult  He initially took some pain medications but was reluctant due to use of his Eliquis with regards to his atrial fibrillation  He tried physical therapy for more than 3 months at the request of Dr Lennox Sorenson and then was also using cane for his left hip which did not much make difference  He was noted by Dr Jimenez Floyd he is outpatient evaluation to have crepitus even with passive range of motion  This was creating difficulty with his activities of daily living  He also started having increasing back pain pain radiating in his left thigh and occasionally getting cramps  His outpatient workup confirmed initially mild-to-moderate degenerative disc disease  Thereafter he also underwent intra-articular injections on 11/14/2019  This only elevated symptoms briefly but he continued to have worsening pain in his left hip  Dr Jimenez Floyd is outpatient examination confirmed severity of his arthritis which was further collaborated with x-rays done on 12/02/2019 indicating severe joint narrowing in the left hip joint with osteophyte formation and subchondral sclerosis without any fractures or dislocations    Patient was confirmed to have advanced osteoarthritis involving left hip and various other options to manage his symptoms and signs were reviewed including possibility of ongoing conservative treatment versus more definitive hip replacement  Patient was keen on getting his surgery done because of not willing to continue with his current situation with chronic pain affecting his quality of life  Patient has undergone the surgery earlier today without any complications and is recuperating well  Prior to his surgery he underwent cardiac catheterization on 02/17/2020 and was cleared for surgery preoperatively  He was not taking his Eliquis prior to surgery and will be resuming later tonight  Review of Systems     Awake and alert cooperative not in any apparent distress  Indicates of having no problems rates that he is doing well has mild pain in his operated left hip  He has waiting to hear democratic presidential debate later tonight on the TV  He has no nausea no vomiting has been eating and drinking without much difficulty  Denies any difficulty with urination but has a Durán catheter  Denies any bleeding no leg cramps  Mood is fair  Denies any headaches no nausea no vomiting  A complete system-based review of systems as discussed with patient is otherwise negative      PAST MEDICAL HISTORY:  Past Medical History:   Diagnosis Date    Arthritis     Atrial fibrillation ([facility] [place] [address] )     Cancer (HCC)     prostate    Dental crowns present     Elevated prostate specific antigen (PSA)     Frequency of micturition     History of pleurisy 1974    History of pneumonia     several years ago    History of prostate cancer     History of radiation therapy     final treatment May 2017    History of rectal bleeding     s/p radiation    [place] (hard of hearing)     bilat w hearing aids    Hypercholesterolemia     Hyperlipidemia     Left thigh pain     Nocturia     Pneumonia     Right hip pain     Unspecified osteoarthritis, unspecified site     Unsteady gait     Wears glasses     Wears hearing aid in both ears      Past Surgical History:   Procedure Laterality Date    CARPAL TUNNEL RELEASE Bilateral     COLONOSCOPY      FOOT SURGERY Right     HEMORRHOID SURGERY      [place] TOTAL HIP ARTHROPLASTY Right 2017    Procedure: ARTHROPLASTY HIP TOTAL ANTERIOR;  Surgeon: Ling Bran MD;  Location: [facility];  Service: Orthopedics    TONSILLECTOMY      TRANSURETHRAL NEEDLE ABLATION OF THE PROSTATE         FAMILY HISTORY:  Non-contributory    SOCIAL HISTORY:  Social History     Substance and Sexual Activity   Alcohol Use Yes    Alcohol/week: 7 0 standard drinks    Types: 7 Glasses of wine per week    Comment: wine daily with a meal or so     Social History     Substance and Sexual Activity   Drug Use No     Social History     Tobacco Use   Smoking Status Former Smoker    Types: Pipe    Last attempt to quit: 1986    Years since quittin 1   Smokeless Tobacco Never Used       ALLERGIES:  No Known Allergies    MEDICATIONS:  All current active medications have been reviewed    Current Facility-Administered Medications   Medication Dose Route Frequency Provider Last Rate Last Dose    acetaminophen (TYLENOL) tablet 650 mg  650 mg Oral Q6H PRN Rishabh Quiroz PA-C        apixaban Payton Aw) tablet 2 5 mg  2 5 mg Oral Once RHONDA Phillips-C        [START ON 2020] apixaban (ELIQUIS) tablet 2 5 mg  2 5 mg Oral BID Rishabh Quiroz PA-C        atorvastatin (LIPITOR) tablet 20 mg  20 mg Oral Once per day on  Rishabh Quiroz PA-C   20 mg at 20 1531    calcium carbonate (TUMS) chewable tablet 1,000 mg  1,000 mg Oral Daily PRN Rishabh Quiroz PA-C        ceFAZolin (ANCEF) IVPB (premix) 1,000 mg  1,000 mg Intravenous Q8H Rishabh Quiroz PA-C   Stopped at 20 1619    celecoxib (CeleBREX) capsule 200 mg  200 mg Oral Daily Rishabh Quiroz PA-C   200 mg at 20 1531    [START ON 2020] diltiazem (CARDIZEM CD) 24 hr capsule 180 mg  180 mg Oral Daily Rishabh Quiroz PA-C        HYDROmorphone (DILAUDID) injection 0 5 mg  0 5 mg Intravenous Q2H PRN Rishabh Rubinria, PA-C        lactated ringers bolus 1,000 mL  1,000 mL Intravenous Once PRN Edra Olive, PA-C        And    lactated ringers bolus 1,000 mL  1,000 mL Intravenous Once PRN Edra Olive, PA-C        lactated ringers infusion  125 mL/hr Intravenous Continuous Edra Olive, PA-C 125 mL/hr at 02/19/20 1324 125 mL/hr at 02/19/20 1324    methocarbamol (ROBAXIN) tablet 500 mg  500 mg Oral Q6H PRN Edra Olive, PA-C        ondansetron [facility]) injection 4 mg  4 mg Intravenous Q6H PRN Edra Olive, PA-C        oxyCODONE (ROXICODONE) immediate release tablet 10 mg  10 mg Oral Q4H PRN Edra Olive, PA-C        oxyCODONE (ROXICODONE) IR tablet 5 mg  5 mg Oral Q4H PRN Edra Olive, PA-C   5 mg at 02/19/20 1531    pantoprazole (PROTONIX) EC tablet 40 mg  40 mg Oral Daily Before Breakfast Edra Olive, PA-C   40 mg at 02/19/20 1531    senna (SENOKOT) tablet 8 6 mg  1 tablet Oral Daily Edra Olive, PA-C   8 6 mg at 02/19/20 1530    simethicone (MYLICON) chewable tablet 80 mg  80 mg Oral 4x Daily PRN Edra Olive, PA-C        sodium chloride 0 9 % bolus 1,000 mL  1,000 mL Intravenous Once PRN Edra Olive, PA-C        And    sodium chloride 0 9 % bolus 1,000 mL  1,000 mL Intravenous Once PRN Edra Olive, PA-C        sodium chloride 0 9 % infusion  125 mL/hr Intravenous Continuous Kam Hill DO   Stopped at 02/19/20 1317    tamsulosin (FLOMAX) capsule 0 4 mg  0 4 mg Oral Daily With Anthony Barber PA-C   0 4 mg at 02/19/20 1530    traMADol (ULTRAM) tablet 50 mg  50 mg Oral Q6H PRN Edra Olive, PA-C           Medications Prior to Admission   Medication    apixaban (ELIQUIS) 2 5 mg    atorvastatin (LIPITOR) 20 mg tablet    cholecalciferol (VITAMIN D3) 1,000 units tablet    diltiazem (CARDIZEM CD) 180 mg 24 hr capsule    Multiple Vitamins-Minerals (MULTIVITAMIN WITH MINERALS) tablet    tamsulosin (FLOMAX) 0 4 mg           PHYSICAL EXAM:    Vitals:  Temp:  [97 4 °F (36 3 °C)-97 7 °F (36 5 °C)] 97 7 °F (36 5 °C)  HR:  [60-86] 75  Resp:  [12-20] 19  BP: (101-140)/(57-75) 138/75  SpO2:  [97 %-100 %] 99 %  Temp (24hrs), Av 6 °F (36 4 °C), Min:97 4 °F (36 3 °C), Max:97 7 °F (36 5 °C)  Current: Temperature: 97 7 °F (36 5 °C)  Body mass index is 26 58 kg/m²  Awake and alert cooperative  Pallor JVP cyanosis negative  Hearing aids are present both outer ear canal  Average built and fair nutrition  Skin turgor is normal without any evidence of dehydration  Bilateral lungs are clear no rales or crackles  Irregularly regular S1-S2, 2/6 ejection systolic murmur  Abdomen is soft and nontender no guarding no rigidity  Indwelling Durán catheter with clear urine no hematuria noted  Well-healed surgical scar kiana noted on the right hip anteriorly-scar is very faint and difficult to see  Clean dressing noted on the operated left hip today/no discharge no drainage  Homans sign is negative  Distal pulses are intact in posterior tibialis dorsalis pedis  Bilateral Venodyne compression boots were noted    LABS, IMAGING, & OTHER STUDIES:  Lab Results:  I have personally reviewed pertinent labs  Lab Results   Component Value Date     2020    CO2 28 2020    BUN 12 2020    CREATININE 0 84 2020    EGFR 84 2020    CALCIUM 9 0 2020    AST 14 2020    ALT 7 (L) 2020    ALKPHOS 120 (H) 2020     Lab Results   Component Value Date    WBC 8 41 2020    WBC 5 95 10/31/2017    HGB 12 3 2020    HGB 9 0 (L) 2017    HGB 10 5 (L) 2017     2020     10/31/2017       Lab Results   Component Value Date    INR 1 07 2017    INR 1 25 (H) 10/31/2017         Imaging Studies:   I have personally reviewed pertinent imaging study reports       Imaging:     Xr Chest Pa & Lateral    Result Date: 2020  Narrative: CHEST INDICATION:   Z01 818: Encounter for other preprocedural examination M16 12: Unilateral primary osteoarthritis, left hip COMPARISON: Chest radiograph from 10/31/2017  EXAM PERFORMED/VIEWS:  XR CHEST PA & LATERAL WITH DUAL ENERGY SUBTRACTION  FINDINGS: Cardiomediastinal silhouette is normal  Lungs are clear  No effusion or pneumothorax  Osseous structures are within normal limits for age  Impression: No acute cardiopulmonary disease  Workstation performed: [id number]     Xr Hip/pelv 1 Vw Left     Result Date: 2/19/2020  Narrative: LEFT HIP INDICATION:   post op  COMPARISON:  None VIEWS:  XR HIP/PELV 1 VW LEFT W PELVIS IF PERFORMED FINDINGS: Left total hip arthroplasty is identified in satisfactory position without evidence of hardware complication  No lytic or blastic osseous lesions  Postoperative changes are seen in the adjacent soft tissues  The visualized lumbar spine is unremarkable  Impression: Left total hip arthroplasty is identified in satisfactory position without evidence of hardware complication  Workstation performed: [id number]       EKG, Pathology, and Other Studies:   I have personally reviewed pertinent reports  Preoperative stress test EKG and cardiac catheterization report from 02/27/2020 was noted       Portions of the record may have been created with voice recognition software  Occasional wrong word or "sound a like" substitutions may have occurred due to the inherent limitations of voice recognition software  Read the chart carefully and recognize, using context, where substitutions have occurred

## 2020-02-19 NOTE — PHYSICAL THERAPY NOTE
Physical Therapy Evaluation      Patient Active Problem List   Diagnosis    Primary localized osteoarthritis of left hip    Rectal bleeding    History of prostate cancer    Urinary frequency       Past Medical History:   Diagnosis Date    Arthritis     At[facility] fibrillation ([facility] [address]     Cancer ([facility] [address] )     prostate    Dental crowns present     Elevated prostate specific antigen (PSA)     Frequency of micturition     History of pleurisy 1974    History of pneumonia     several years ago    History of prostate cancer     History of radiation therapy     final treatment May 2017    History of rectal bleeding     s/p radiation    [place] (hard of hearing)     bilat w hearing aids    Hypercholesterolemia     Hyperlipidemia     Left thigh pain     Nocturia     Pneumonia     Right hip pain     Unspecified osteoarthritis, unspecified site     Unsteady gait     Wears glasses     Wears hearing aid in both ears        Past Surgical History:   Procedure Laterality Date    CARPAL TUNNEL RELEASE Bilateral     COLONOSCOPY      FOOT SURGERY Right     HEMORRHOID SURGERY      [place] TOTAL HIP ARTHROPLASTY Right 11/29/2017    Procedure: ARTHROPLASTY HIP TOTAL ANTERIOR;  Surgeon: Bonnie Rodriguez MD;  Location: [facility];  Service: Orthopedics    TONSILLECTOMY      TRANSURETHRAL NEEDLE ABLATION OF THE PROSTATE        02/19/20 6151   Note Type   Note type Eval only   Pain Assessment   Pain Assessment 0-10   Pain Score 6   Pain Type Acute pain;Surgical pain   Pain Location Hip   Pain Orientation Left   Hospital Pain Intervention(s) Cold applied;Repositioned; Ambulation/increased activity; Emotional support   Response to Interventions no change in pain level   tolerated well    Home Living   Type of [address] Two level;Bed/bath upstairs;Stairs to enter without rails  (2 stairs to enter, may be able to stay on first floor  )   Home Equipment Walker;Cane   Prior Function   Level of [address] with ADLs and functional mobility   Lives With Spouse   Receives Help From Family   ADL Assistance Independent   IADLs Independent   Falls in the last 6 months 0   Comments pt has rw and cane from prior  Wisam Torres denies having bsc  pt reports using cane on occasion due to L hip pain  pt prefers to go OPPT at [facility]   Restrictions/Precautions   Weight Bearing Precautions Per Order Yes   LLE Weight Bearing Per Order WBAT   Other Precautions WBS;THR;Multiple lines; Fall Risk;Pain  (ant hip- flexion per order)   General   Additional Pertinent History pt had cardiac cath on monday   Family/Caregiver Present No   Cognition   Overall Cognitive Status WFL   Orientation Level Oriented X4   RUE Assessment   RUE Assessment WFL   LUE Assessment   LUE Assessment WFL   RLE Assessment   RLE Assessment WFL   LLE Assessment   LLE Assessment X  (str 3-/5, limited hip and knee flexion due to pain)   Coordination   Movements are Fluid and Coordinated 1   Sensation X  (feet are sensitive)   Light Touch   RLE Light Touch Grossly intact   LLE Light Touch Grossly intact   Bed Mobility   Supine to Sit 4  Minimal assistance   Additional items Assist x 1; Increased time required;Verbal cues;LE management   Sit to Supine 4  Minimal assistance   Additional items Assist x 1; Increased time required;Verbal cues;LE management   Additional Comments seated /68   Transfers   Sit to Stand 4  Minimal assistance   Additional items Armrests; Increased time required;Verbal cues   Stand to Sit 4  Minimal assistance   Additional items Armrests; Increased time required;Verbal cues   Ambulation/Elevation   Gait pattern Antalgic; Forward Flexion;Decreased foot clearance;Shuffling;Decreased L stance; Inconsistent meryl; Short stride; Step to;Excessively slow   Gait Assistance 4  Minimal assist   Additional items Assist x 1;Verbal cues; Tactile cues   Assistive Device Rolling walker   Distance 5'   Balance   Static Sitting Fair   Dynamic Sitting Fair -   Static Standing Poor + Dynamic Standing Poor +   Ambulatory Poor +   Endurance Deficit   Endurance Deficit Yes   Endurance Deficit Description pain L hip   Activity Tolerance   Activity Tolerance Patient tolerated treatment well;Patient limited by pain   Nurse Made Aware yes   Assessment   Prognosis Good   Problem List Decreased strength;Decreased range of motion;Decreased endurance; Impaired balance;Decreased mobility; Decreased safety awareness; Impaired sensation;Decreased skin integrity;Orthopedic restrictions;Pain   Assessment pt admitted with severe l hip pain due to djd and failed conservative care  pt referred to PT after T anterior CELESTINO  pt was indep PTA witout assistive device but using cane on ocasion when l hip pain was worse  pt demonstrated mild to moderate functional limitations due to recent L thr  pt has current deficits in strength, rom, balance, gait sequencing and stability, pain control and safety awareness  pt will need skilled PT  pt wa able to mobilize to edge of bed with min assist  transfer to bedside chair wtih min assist and rw wbat lle  pt continued to reort moderate to severe l hip pain  ice applied and pt made comfortable  BP satisfactory  nursing notified  pt will need skilled PT to maximize function to return home  pt desires OPPT at [facility]  has recliner to sleep on firts floor if needed  Barriers to Discharge None   Goals   Patient Goals less pain   STG Expiration Date 02/23/20   Short Term Goal #1 indep with bed mobility, transfers, amb using rw wbat lle for > 150'   stairs with railing and supervision  indep HEP, demonstrate good safety practices  improve strength and balance by 1/2 grade  Plan   Treatment/Interventions Functional transfer training;LE strengthening/ROM; Elevations; Therapeutic exercise; Endurance training;Patient/family training;Equipment eval/education; Bed mobility;Gait training; Compensatory technique education;Spoke to nursing   PT Frequency 7x/wk; Twice a day   Recommendation Recommendation Home with family support; Outpatient PT   Equipment Recommended Walker  (has)   PT - OK to Discharge No   Modified Socorro Scale   Modified Socorro Scale 4   Barthel Index   Feeding 10   Bathing 0   Grooming Score 5   Dressing Score 5   Bladder Score 0   Bowels Score 10   Toilet Use Score 5   Transfers (Bed/Chair) Score 10   Mobility (Level Surface) Score 0   Stairs Score 0   Barthel Index Score 45   History: co - morbidities, fall risk, use of assistive device, assist for adl's,  multiple lines  Exam: impairments in locomotion, musculoskeletal, balance, joint integrity, skin integrity, Clinical: unstable/unpredictable  Complexity:high      Hannah Sow, PT

## 2020-02-20 PROBLEM — M16.12 PRIMARY OSTEOARTHRITIS OF LEFT HIP: Status: ACTIVE | Noted: 2020-02-20

## 2020-02-20 LAB
ANION GAP SERPL CALCULATED.3IONS-SCNC: 8 MMOL/L (ref 4–13)
BUN SERPL-MCNC: 9 MG/DL (ref 5–25)
CALCIUM SERPL-MCNC: 7.7 MG/DL (ref 8.3–10.1)
CHLORIDE SERPL-SCNC: 108 MMOL/L (ref 100–108)
CO2 SERPL-SCNC: 24 MMOL/L (ref 21–32)
CREAT SERPL-MCNC: 0.88 MG/DL (ref 0.6–1.3)
GFR SERPL CREATININE-BSD FRML MDRD: 82 ML/MIN/1.73SQ M
GLUCOSE SERPL-MCNC: 104 MG/DL (ref 65–140)
HCT VFR BLD AUTO: 31.9 % (ref 36.5–49.3)
HGB BLD-MCNC: 10.2 G/DL (ref 12–17)
POTASSIUM SERPL-SCNC: 3.9 MMOL/L (ref 3.5–5.3)
SODIUM SERPL-SCNC: 140 MMOL/L (ref 136–145)

## 2020-02-20 PROCEDURE — 97110 THERAPEUTIC EXERCISES: CPT

## 2020-02-20 PROCEDURE — 97116 GAIT TRAINING THERAPY: CPT

## 2020-02-20 PROCEDURE — 97166 OT EVAL MOD COMPLEX 45 MIN: CPT

## 2020-02-20 PROCEDURE — 85018 HEMOGLOBIN: CPT | Performed by: PHYSICIAN ASSISTANT

## 2020-02-20 PROCEDURE — 85014 HEMATOCRIT: CPT | Performed by: PHYSICIAN ASSISTANT

## 2020-02-20 PROCEDURE — 97530 THERAPEUTIC ACTIVITIES: CPT

## 2020-02-20 PROCEDURE — 97535 SELF CARE MNGMENT TRAINING: CPT

## 2020-02-20 PROCEDURE — 80048 BASIC METABOLIC PNL TOTAL CA: CPT | Performed by: PHYSICIAN ASSISTANT

## 2020-02-20 RX ORDER — ASCORBIC ACID 500 MG
250 TABLET ORAL DAILY
Status: DISCONTINUED | OUTPATIENT
Start: 2020-02-20 | End: 2020-02-22 | Stop reason: HOSPADM

## 2020-02-20 RX ORDER — FERROUS SULFATE 325(65) MG
325 TABLET ORAL 2 TIMES DAILY WITH MEALS
Qty: 60 TABLET | Refills: 0 | Status: SHIPPED | OUTPATIENT
Start: 2020-02-20

## 2020-02-20 RX ORDER — CELECOXIB 200 MG/1
200 CAPSULE ORAL DAILY
Qty: 30 CAPSULE | Refills: 0
Start: 2020-02-20 | End: 2020-03-21

## 2020-02-20 RX ORDER — OXYCODONE HYDROCHLORIDE 5 MG/1
5 TABLET ORAL EVERY 4 HOURS PRN
Qty: 30 TABLET | Refills: 0
Start: 2020-02-20

## 2020-02-20 RX ORDER — ASCORBIC ACID 250 MG
250 TABLET ORAL DAILY
Qty: 30 TABLET | Refills: 0 | Status: SHIPPED | OUTPATIENT
Start: 2020-02-20

## 2020-02-20 RX ORDER — TRAMADOL HYDROCHLORIDE 50 MG/1
50 TABLET ORAL EVERY 6 HOURS PRN
Qty: 30 TABLET | Refills: 0
Start: 2020-02-20

## 2020-02-20 RX ORDER — METHOCARBAMOL 500 MG/1
500 TABLET, FILM COATED ORAL EVERY 6 HOURS PRN
Qty: 30 TABLET | Refills: 0
Start: 2020-02-20

## 2020-02-20 RX ORDER — FERROUS SULFATE 325(65) MG
325 TABLET ORAL 2 TIMES DAILY WITH MEALS
Status: DISCONTINUED | OUTPATIENT
Start: 2020-02-20 | End: 2020-02-22 | Stop reason: HOSPADM

## 2020-02-20 RX ADMIN — APIXABAN 2.5 MG: 2.5 TABLET, FILM COATED ORAL at 09:12

## 2020-02-20 RX ADMIN — TAMSULOSIN HYDROCHLORIDE 0.4 MG: 0.4 CAPSULE ORAL at 16:56

## 2020-02-20 RX ADMIN — OXYCODONE HYDROCHLORIDE 5 MG: 5 TABLET ORAL at 16:56

## 2020-02-20 RX ADMIN — FERROUS SULFATE TAB 325 MG (65 MG ELEMENTAL FE) 325 MG: 325 (65 FE) TAB at 21:25

## 2020-02-20 RX ADMIN — DILTIAZEM HYDROCHLORIDE 180 MG: 180 CAPSULE, COATED, EXTENDED RELEASE ORAL at 09:12

## 2020-02-20 RX ADMIN — OXYCODONE HYDROCHLORIDE AND ACETAMINOPHEN 250 MG: 500 TABLET ORAL at 21:25

## 2020-02-20 RX ADMIN — METHOCARBAMOL TABLETS 500 MG: 500 TABLET, COATED ORAL at 07:24

## 2020-02-20 RX ADMIN — SENNOSIDES 8.6 MG: 8.6 TABLET, FILM COATED ORAL at 09:12

## 2020-02-20 RX ADMIN — CELECOXIB 200 MG: 200 CAPSULE ORAL at 09:12

## 2020-02-20 RX ADMIN — APIXABAN 2.5 MG: 2.5 TABLET, FILM COATED ORAL at 18:01

## 2020-02-20 RX ADMIN — OXYCODONE HYDROCHLORIDE 10 MG: 10 TABLET ORAL at 12:10

## 2020-02-20 RX ADMIN — OXYCODONE HYDROCHLORIDE 10 MG: 10 TABLET ORAL at 07:24

## 2020-02-20 RX ADMIN — PANTOPRAZOLE SODIUM 40 MG: 40 TABLET, DELAYED RELEASE ORAL at 06:11

## 2020-02-20 NOTE — PROGRESS NOTES
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 of 2017  Ambulatory dysfunction  Chronic anticoagulation with Eliquis for atrial fibrillation-follows Dr Gian Guy  Prostate cancer/ s/p radiation therapy 2017 for prostate cancer-follows Dr Slava Henderson  BPH  Hypercholesterolemia  Recent cardiac catheterization-02/17/2020  Hearing impairment uses hearing aids  Mild acute blood loss anemia  Recommendation:    · Continue postoperative care as per primary orthopedic service  · Overnight events were reviewed earlier this morning and he has been doing well  His pain is well controlled with current analgesics  · DVT prophylaxis with patient's current dose of Eliquis  Discussed with Dr Ibis Gonzalez  · Patient will need quick outpatient follow-up with regards to his underlying history of atrial fibrillation with Dr Gian Guy  If his blood pressure remains an issue then perhaps cutting down the dose of Cardizem may be an option  Will monitor him clinically  Advise patient to increase his intake of fluids  · Orthostatic precautions particularly for the fact that he is taking Flomax  Full fall precautions, continue OT and PT  · Ferrous sulfate with ascorbic acid and recheck hemoglobin to ensure that he does not have further drop that may necessitate any blood transfusion  Subjective:     Patient seen and examined today  EMR and overnight events reviewed  Patient was evaluated earlier this morning while he was eating his breakfast   He denied any new complaints  His pain was controlled with analgesics  Denies any bleeding  Range of motion on his operated hip is limited and he has been using ice packs    He has not experienced any chest pain palpitations dizziness or lightheadedness  Review of Systems      Denies any chest pain nausea vomiting denies any fever chills or rigors  No abdominal pain no diarrhea constipation  No bleeding from his surgical site  Denies any skin rashes pruritus  Denies any numbness tingling  Range of motion slightly limited on his operated left hip area due to immediate surgery but otherwise moving all other joints without difficulty  All other systems reviewed and are negative  OBJECTIVE:     Vitals:     Temp:  [97 4 °F (36 3 °C)-98 1 °F (36 7 °C)] 98 °F (36 7 °C)  HR:  [60-93] 92  Resp:  [12-20] 18  BP: (101-140)/(57-91) 114/60  SpO2:  [95 %-100 %] 97 %  Temp (24hrs), Av 8 °F (36 6 °C), Min:97 4 °F (36 3 °C), Max:98 1 °F (36 7 °C)  Current: Temperature: 98 °F (36 7 °C)    Intake/Output Summary (Last 24 hours) at 2020 0716  Last data filed at 2020 0300  Gross per 24 hour   Intake 4800 ml   Output 2775 ml   Net 2025 ml     Body mass index is 26 61 kg/m²  Physical Exam    Awake and alert cooperative sitting comfortably on the site chair    Pallor JVP cyanosis negative  Hydration average tongue protrudes midline with no thrush  Bilateral lungs are clear no rales or crackles  Irregularly irregular S1-S2 2/6 ejection systolic murmur left ventricular apex  Abdomen is soft without any tenderness guarding good bowel sounds  Operated left hip clean dressing no discharge no drainage  Ice packs keeping area cold no particular swelling no discharge no drainage no dehiscence  Bilateral Venodyne compression boots were noted/distal pulses are intact  Homans sign is negative  Range of motion on hip flexion is limited on the left operated site  Higher mental function intact good hand grasp and plantar flexion    Labs, Imaging, & Other studies:    All pertinent labs and imaging studies were personally reviewed  Results from last 7 days   Lab Units 20  0437   HEMOGLOBIN g/dL 10 2*     Results from last 7 days   Lab Units 02/20/20  0437   POTASSIUM mmol/L 3 9   CHLORIDE mmol/L 108   CO2 mmol/L 24   BUN mg/dL 9   CREATININE mg/dL 0 88   EGFR ml/min/1 73sq m 82   CALCIUM mg/dL 7 7*           Current Meds:  Current Facility-Administered Medications   Medication Dose Route Frequency Provider Last Rate Last Dose    acetaminophen (TYLENOL) tablet 650 mg  650 mg Oral Q6H PRN Molly Flores PA-C        apixaban (ELIQUIS) tablet 2 5 mg  2 5 mg Oral BID Lev Linda Beverly PA-C        atorvastatin (LIPITOR) tablet 20 mg  20 mg Oral Once per day on Mon Wed Fri Kam Beverly PA-C   20 mg at 02/19/20 1531    calcium carbonate (TUMS) chewable tablet 1,000 mg  1,000 mg Oral Daily PRN Molly Flores PA-C        celecoxib (CeleBREX) capsule 200 mg  200 mg Oral Daily Molly Flores PA-C   200 mg at 02/19/20 1531    diltiazem (CARDIZEM CD) 24 hr capsule 180 mg  180 mg Oral Daily Kam Beverly PA-C        HYDROmorphone (DILAUDID) injection 0 5 mg  0 5 mg Intravenous Q2H PRN Molly Flores PA-C        lactated ringers bolus 1,000 mL  1,000 mL Intravenous Once PRN Molly Flores PA-C        And    lactated ringers bolus 1,000 mL  1,000 mL Intravenous Once PRN Molly Flores PA-C        lactated ringers infusion  125 mL/hr Intravenous Continuous Molly Flores PA-C 125 mL/hr at 02/19/20 1324 125 mL/hr at 02/19/20 1324    methocarbamol (ROBAXIN) tablet 500 mg  500 mg Oral Q6H PRN Molly Flores PA-C        ondansetron [facility]) injection 4 mg  4 mg Intravenous Q6H PRN Molly Flores PA-C        oxyCODONE (ROXICODONE) immediate release tablet 10 mg  10 mg Oral Q4H PRN Molly Flores PA-C   10 mg at 02/19/20 2006    oxyCODONE (ROXICODONE) IR tablet 5 mg  5 mg Oral Q4H PRN Molly Flores PA-C   5 mg at 02/19/20 1531    pantoprazole (PROTONIX) EC tablet 40 mg  40 mg Oral Daily Before Breakfast Molly Flores PA-C   40 mg at 02/20/20 8259    senna (SENOKOT) tablet 8 6 mg  1 tablet Oral Daily Molly Flores PA-C   8 6 mg at 02/19/20 1530    simethicone (MYLICON) chewable tablet 80 mg  80 mg Oral 4x Daily PRN Ferna Albert, PA-C        sodium chloride 0 9 % bolus 1,000 mL  1,000 mL Intravenous Once PRN Ferna Albert, PA-C        And    sodium chloride 0 9 % bolus 1,000 mL  1,000 mL Intravenous Once PRN Ferna Albert, PA-C        sodium chloride 0 9 % infusion  125 mL/hr Intravenous Continuous Kam Hill DO   Stopped at 02/19/20 1317    tamsulosin (FLOMAX) capsule 0 4 mg  0 4 mg Oral Daily With Charmayne Breen, PA-C   0 4 mg at 02/19/20 1530    traMADol (ULTRAM) tablet 50 mg  50 mg Oral Q6H PRN Ferna Albert, PA-C         Home Meds:  Medications Prior to Admission   Medication    apixaban (ELIQUIS) 2 5 mg    atorvastatin (LIPITOR) 20 mg tablet    cholecalciferol (VITAMIN D3) 1,000 units tablet    diltiazem (CARDIZEM CD) 180 mg 24 hr capsule    Multiple Vitamins-Minerals (MULTIVITAMIN WITH MINERALS) tablet    tamsulosin (FLOMAX) 0 4 mg       Continuous Infusions:    lactated ringers 125 mL/hr Last Rate: 125 mL/hr (02/19/20 1324)   sodium chloride 125 mL/hr Last Rate: Stopped (02/19/20 1317)       Invasive Devices     Peripheral Intravenous Line            Peripheral IV 02/19/20 Left Hand less than 1 day                VTE Pharmacologic Prophylaxis: Eliquis as per Primary Ortho Team   VTE Mechanical Prophylaxis: sequential compression device    Portions of the record may have been created with voice recognition software  Occasional wrong word or "sound a like" substitutions may have occurred due to the inherent limitations of voice recognition software  Read the chart carefully and recognize, using context, where substitutions have occurred

## 2020-02-20 NOTE — DISCHARGE INSTRUCTIONS
[facility]   Post Operative Joint Replacement Instructions   Dr Hernandez Comp Office [phone] [phone]     Remove dressing post op day 7  Home Health care will remove the staples/sutures post op day 14  ALL MEDICATIONS HAVE BEEN E-SCRIBED TO YOUR PHARMACY  -If you were prescribed the pain medication Tramadol, it will be e-prescribed to your pharmacy 3 days later than your other prescriptions  MEDICATIONS      *  Resume your regular dose of Eliquis 2 5mg twice daily  * Iron: Continue the iron supplement twice daily  If you experience nausea or constipation with the medicine discontinue its use  Contact us if the nausea persists  * Pain Medications: Your prescriptions may run out before you return for your next visit  Please give us two regular office day's notice before you run out, to prevent any problems of not having pain medicine  Be advised that prescriptions for Percocet and OxyContin cannot be phoned to your pharmacy  They will need to be picked up at our office  Please refrain from using alcohol with your pain medicines  Percocet contains 325 mg of Tylenol  You should not exceed 3000 mg of Tylenol in a day  Please be careful to not overdose the Tylenol  * Herbal Medicines: Please hold all these preparations until after your first post-operative visit  Unless specifically directed otherwise  ACTIVITY   * Your weight bearing status is: as tolerated     * If you have been furnished with an assistive device  Please feel free to try to wean from using it under the supervision of your therapist      * You may participate in activity as tolerated  It is likely that you will tire more easily for a time after surgery  Please rest when you need it to help your healing process  * Stairs are not restricted for you   Please climb stairs as instructed by your physical therapist      * For hip and knee replacement patients please elevate your leg or legs while resting  This is important to prevent lower leg swelling  * When you are back at home you will likely have physical therapy three times a week  On the days you do not have formal therapy please perform the home exercises given to you  * If you had a hip replacement, please follow the safety precautions given to you by the nurse or therapist taking care of you  * Immediately following the surgery you are not permitted to drive until cleared by your surgeon  This typically does not happen until your first visit after surgery  * Knee replacement patients may be passengers in a vehicle following their discharge from the hospital      * Hip replacement patients are not allowed in a vehicle, except for your trip home and your first follow up visit  Please follow these guidelines unless specifically instructed to start outpatient therapy at an earlier time  * Please do not perform lifting tasks or strenuous domestic activities  * If you currently are employed, you are off work until cleared by your surgeon  Everyone's ability to return to work is determined by his or her abilities  Your return to work may take a few weeks to a few months  * Sexual activities are permitted as tolerated  NORMAL FINDINGS   * Numbness along the incision     * Mechanical click of a knee replacement  * Your incision area will feel warm  WOUND CARE   * It is OK to shower once there is no spotting or drainage for a full 24 hours  Please do not submerge the incision into a pool, bath or hot tub until after your 1st post-operative visit  * Please do not disturb your staples, sutures, or the scabs  It promotes better healing and smaller scars  * Cover the incision with gauze dressing until there is no further drainage  * You may leave the incision open to the air when there is no discharge left on the gauze at changing time  Once this occurs you may shower       * Please be generous with the use of ice  It is a very good remedy  Apply ice for only twenty minutes at a time  You may use it every hour  It is recommended to ice before and after anticipated activities, which includes exercise and physical therapy  * Wear your knee-high pressure stockings every day  They may be removed for sleep at night  Continue to wear them until you are seen for your first follow up  * For knee and hip replacements; your staples will be removed about 14 days after your surgery date  Home nurses and physical therapists typically remove them  If they are unable to, please call our office to have us do it for you  FOLLOW UP   * You should have a scheduled visit with your surgeon scheduled for three to four weeks after surgery  If you do not remember your appointment date and time, or if you are sure you do not have one, please call to set one up  * Please inform the office if you experience one of the following:    - Fever that is not responding to Tylenol and is above 101 degrees    - Redness of the skin that is increasing in area    - Your incision is soaking the dressings with drainage or blood    - You're unable to bear weight on your operative leg or legs

## 2020-02-20 NOTE — PHYSICAL THERAPY NOTE
02/20/20 0925   Pain Assessment   Pain Assessment 0-10   Pain Score 8   Pain Type Surgical pain   Pain Location Hip   Pain Orientation Left   Precautions   Total Hip Replacement ADduction; Internal rotation; Flexion   Restrictions/Precautions   Weight Bearing Precautions Per Order Yes   RUE Weight Bearing Per Order WBAT   LLE Weight Bearing Per Order WBAT   Other Precautions WBS;THR;Fall Risk;Pain   General   Chart Reviewed Yes   Cognition   Orientation Level Oriented X4   Comments reviewed THR precautions   Subjective   Subjective pt agreeable to participate in PT  tx this a m  Bed Mobility   Supine to Sit 3  Moderate assistance   Additional items Assist x 1;LE management;Verbal cues; Bedrails   Transfers   Sit to Stand 4  Minimal assistance   Additional items Assist x 1   Stand to Sit 4  Minimal assistance   Additional items Assist x 1   Ambulation/Elevation   Gait pattern Antalgic; Forward Flexion;Decreased L stance; Step to   Gait Assistance 4  Minimal assist   Additional items Assist x 1;Verbal cues   Assistive Device Rolling walker   Distance 15'x1   Balance   Static Sitting Fair +   Dynamic Sitting Fair   Static Standing Fair -   Dynamic Standing Poor +   Ambulatory Poor +   Endurance Deficit   Endurance Deficit Yes   Endurance Deficit Description fatigue ,pain   Activity Tolerance   Activity Tolerance Patient limited by fatigue;Patient limited by pain   Nurse Made Aware Lj MANN Shadow   Exercises   THR Supine;Sitting;10 reps;AROM;AAROM; Left   Assessment   Prognosis Good   Problem List Decreased strength;Decreased range of motion;Decreased endurance; Impaired balance;Decreased mobility; Decreased coordination;Decreased cognition; Impaired judgement;Decreased safety awareness;Pain;Orthopedic restrictions   Assessment Pt was able to progress gait training distance to 15' with RW and min assist x 1 vs  Last tx session 5' x 1   Pt required AAROM for most exercises today secondary to weakness   Pt transfer supine to sit with mod assist x 1 , however he does attempt to assist   Pt goals are to go home and receive home P T  , Continue with current POC     Goals   Patient Goals TO go home   STG Expiration Date 02/23/20   PT Treatment Day 1   Liza Cameron, PTA

## 2020-02-20 NOTE — PLAN OF CARE
Problem: OCCUPATIONAL THERAPY ADULT  Goal: Performs self-care activities at highest level of function for planned discharge setting  See evaluation for individualized goals  Description  Treatment Interventions: ADL retraining, Functional transfer training, UE strengthening/ROM, Endurance training, Patient/family training, Equipment evaluation/education, Compensatory technique education, Activityengagement          See flowsheet documentation for full assessment, interventions and recommendations  Outcome: Progressing  Note:   Limitation: Decreased ADL status, Decreased endurance, Decreased self-care trans, Decreased high-level ADLs  Prognosis: Good  Assessment: Pt is a 67 y o  male seen for OT evaluation s/p adm to [facility] on [facility]/19/2020 s/p L total hip arthroplasty- Anterior approach  Pt w/ orders for WBAT L LE  Comorbidities affecting pts functional performance include a significant PMH of Arthritis, A-Fib, HLD, PNA, and Hypercholesterolemia  Pt with active OT orders and activity orders for Activity as tolerated  Pt lives w/ spouse in a two level house with 2 JOSE M in front and 1 JOSE M through garage  Pt reports able to stay on 1st floor if needed at D/C  At baseline, pt was I w/ ADLs, IADLs, and functional mobility/transfers w/ use of SPC as needed 2* pain, (+) , PT employed, and reports 0 falls PTA  Upon evaluation, pt currently requires Supervision for UB ADLs, Min A for LB ADLs, Min A for toileting, and Min A for functional mobility/transfers 2* the following deficits impacting occupational performance: weakness, decreased strength, decreased balance, decreased tolerance and increased pain  These impairments, as well at pts steps to enter environment, difficulty performing ADLS and difficulty performing IADLS  limit pts ability to safely engage in all baseline areas of occupation  Pt scored overall 55/100 on the Barthel Index   Based on the aforementioned OT evaluation, functional performance deficits, and assessments, pt has been identified as a Moderate complexity evaluation  Pt to continue to benefit from continued acute OT services during hospital stay to address defined deficits and to maximize level of functional independence in the following Occupational Performance areas: grooming, bathing/shower, toilet hygiene, dressing, medication management, health maintenance, functional mobility, community mobility and clothing management  From OT standpoint, recommend Home OT upon D/C   OT will continue to follow pt 3-5x/wk to address the following goals to  w/in 7-10 days:     OT Discharge Recommendation: Home OT  OT - OK to Discharge: (when medically cleared & pending stair training w/ PT)

## 2020-02-20 NOTE — OCCUPATIONAL THERAPY NOTE
Occupational Therapy Evaluation     Patient Name: Thom Fox  [id number] Date: 2/20/2020  Problem List  Active Problems:    Primary localized osteoarthritis of left hip    Past Medical History  Past Medical History:   Diagnosis Date    Arthritis     Atrial fibrillation [facility] [address]     Cancer ([facility] [address] )     prostate    Dental crowns present     Elevated prostate specific antigen (PSA)     Frequency of micturition     History of pleurisy 1974    History of pneumonia     several years ago    History of prostate cancer     History of radiation therapy     final treatment May 2017    History of rectal bleeding     s/p radiation    [place] (hard of hearing)     bilat w hearing aids    Hypercholesterolemia     Hyperlipidemia     Left thigh pain     Nocturia     Pneumonia     Right hip pain     Unspecified osteoarthritis, unspecified site     Unsteady gait     Wears glasses     Wears hearing aid in both ears      Past Surgical History  Past Surgical History:   Procedure Laterality Date    CARPAL TUNNEL RELEASE Bilateral     COLONOSCOPY      FOOT SURGERY Right     HEMORRHOID SURGERY      CT TOTAL HIP ARTHROPLASTY Right 11/29/2017    Procedure: ARTHROPLASTY HIP TOTAL ANTERIOR;  Surgeon: Sana Caldwell MD;  Location: [facility];  Service: Orthopedics    CT TOTAL HIP ARTHROPLASTY Left 2/19/2020    Procedure: ARTHROPLASTY HIP TOTAL ANTERIOR;  Surgeon: Sana Caldwell MD;  [facility];  Service: Orthopedics    TONSILLECTOMY      TRANSURETHRAL NEEDLE ABLATION OF THE PROSTATE             02/20/20 0938   Note Type   Note type Eval/Treat   Restrictions/Precautions   Weight Bearing Precautions Per Order Yes   LLE Weight Bearing Per Order WBAT   Other Precautions WBS;THR;Multiple lines; Fall Risk;Pain  (Anterior approach, no restrictions per order)   Pain Assessment   Pain Assessment 0-10   Pain Score 8   Pain Type Surgical pain   Pain Location Hip   Pain Orientation Left   Effect of Pain on Daily Activities Increased pain with activity, limiting overall activity tolerance   Patient's Stated Pain Goal No pain   Hospital Pain Intervention(s) Cold applied;Repositioned; Ambulation/increased activity; Emotional support; Rest   Response to Interventions Tolerated OT   Home Living   Type of [address] [facility] Two level;Stairs to enter without rails;1/2 bath on main level;Bed/bath upstairs  (2 JOSE M in front, 1 JOSE M in garage; can stay on 1st )   Bathroom Shower/Tub Walk-in shower   Bathroom Toilet Standard   Bathroom Equipment Built-in shower seat;Grab bars in [id number] Walker;Cane   Additional Comments Pt lives w/ spouse in a two level house with 2 JOSE M in front and 1 JOSE M through garage  Pt reports able to stay on 1st floor if needed at D/C  Prior Function   Level of [place] Independent with ADLs and functional mobility; Needs assistance with IADLs   Lives With Spouse   Receives Help From Family   ADL Assistance Independent   IADLs Needs assistance   Falls in the last 6 months 0   Vocational Part time employment   Comments At baseline, pt was I w/ ADLs, IADLs, and functional mobility/transfers w/ use of SPC as needed 2* pain, (+) , PT employed, and reports 0 falls PTA  Lifestyle   Autonomy At baseline, pt was I w/ ADLs, IADLs, and functional mobility/transfers w/ use of SPC as needed 2* pain, (+) , PT employed, and reports 0 falls PTA      Reciprocal Relationships Spouse   Service to Others PT employed   Intrinsic Gratification Watching TV   Psychosocial   Psychosocial (WDL) WDL   ADL   Where Assessed Chair   Eating Assistance 5  Supervision/Setup   Grooming Assistance 5  Supervision/Setup   UB Bathing Assistance 5  Supervision/Setup   LB Bathing Assistance 4  Minimal Assistance   UB Dressing Assistance 5  Supervision/Setup   LB Dressing Assistance 4  Minimal Assistance   Toileting Assistance  4  Minimal [address] 4  Minimal Assistance   Bed Mobility   Supine to Sit Unable to assess   Sit to Supine Unable to assess   Additional Comments Pt seated EOB w/ PT upon entering room  Pt seated OOB in chair at end of session w/ call bell and phone within reach  All needs met and pt reports no further questions for OT at this time   Transfers   Sit to Stand 4  Minimal assistance   Additional items Assist x 1; Increased time required;Verbal cues   Stand to Sit 4  Minimal assistance   Additional items Assist x 1; Armrests; Increased time required;Verbal cues   Additional Comments Cues for safe technique and placement of hands and operative LE   Functional Mobility   Functional Mobility 4  Minimal assistance   Additional Comments Assist x1   Additional items Rolling walker   Balance   Static Sitting Fair +   Dynamic Sitting Fair   Static Standing Fair -   Dynamic Standing Poor +   Ambulatory Poor +   Activity Tolerance   Activity Tolerance Patient limited by pain   Medical Staff Made [facility]   Nurse Made Aware yes; Arley Morales RN   RUE Assessment   RUE Assessment WFL   LUE Assessment   LUE Assessment WFL   Hand Function   Gross Motor Coordination Functional   Fine Motor Coordination Functional   Sensation   Light Touch No apparent deficits   Proprioception   Proprioception No apparent deficits   Vision-Basic Assessment   Current Vision Wears glasses all the time   Vision - Complex Assessment   Ocular Range of Motion [facility]   Acuity Able to read clock/calendar on wall without difficulty   Perception   Inattention/Neglect Appears intact   Cognition   Overall Cognitive Status [facility]   Arousal/Participation Alert; Cooperative   Attention Within functional limits   Orientation Level Oriented X4   Memory Within functional limits   Following Commands Follows one step commands without difficulty   Comments Pt pleasant and cooperative; Engages in conversation appropriately   Assessment   Limitation Decreased ADL status; Decreased endurance;Decreased self-care trans;Decreased high-level ADLs   Prognosis Good   Assessment Pt is a 67 y o  male seen for OT evaluation s/p adm to [facility] on 2020 s/p L total hip arthroplasty- Anterior approach  Pt w/ orders for WBAT L LE  Comorbidities affecting pts functional performance include a significant PMH of Arthritis, A-Fib, HLD, PNA, and Hypercholesterolemia  Pt with active OT orders and activity orders for Activity as tolerated  Pt lives w/ spouse in a two level house with 2 JOSE M in front and 1 JOSE M through garage  Pt reports able to stay on 1st floor if needed at D/C  At baseline, pt was I w/ ADLs, IADLs, and functional mobility/transfers w/ use of SPC as needed 2* pain, (+) , PT employed, and reports 0 falls PTA  Upon evaluation, pt currently requires Supervision for UB ADLs, Min A for LB ADLs, Min A for toileting, and Min A for functional mobility/transfers 2* the following deficits impacting occupational performance: weakness, decreased strength, decreased balance, decreased tolerance and increased pain  These impairments, as well at pts steps to enter environment, difficulty performing ADLS and difficulty performing IADLS  limit pts ability to safely engage in all baseline areas of occupation  Pt scored overall 55/100 on the Barthel Index  Based on the aforementioned OT evaluation, functional performance deficits, and assessments, pt has been identified as a Moderate complexity evaluation  Pt to continue to benefit from continued acute OT services during hospital stay to address defined deficits and to maximize level of functional independence in the following Occupational Performance areas: grooming, bathing/shower, toilet hygiene, dressing, medication management, health maintenance, functional mobility, community mobility and clothing management  From OT standpoint, recommend Home OT upon D/C   OT will continue to follow pt 3-5x/wk to address the following goals to  w/in 7-10 days:   Goals Patient Goals To have less pain   LTG Time Frame 7-10   Long Term Goal Please refer to LTGs listed below   Plan   Treatment Interventions ADL retraining;Functional transfer training;UE strengthening/ROM; Endurance training;Patient/family training;Equipment evaluation/education; Compensatory technique education; Activityengagement   Goal Expiration Date 03/01/20   OT Treatment Day 1   OT Frequency 3-5x/wk   Additional Treatment Session   Start Time 9982   End Time 5815   Treatment Assessment Pt seen for additional OT treatment session focusing on functional activity tolerance, ADLs, and education on LH AE  Pt alert and cooperative throughout session  Pt seated OOB in chair after completion of initial evaluation  During initial evaluation, pt reports increased difficulty w/ LB ADLs 2* impaired functional reach  Educated pt on available LH AE (sock aid, dressing stick, LH sponge, LH shoehorn, reacher) through verbal instructions and demonstration  After education, pt able to complete LB dressing (don/doff B/L socks) w/ Supervision w/ use of sock aid and dressing stick  Cues required for carryover of technique with LH AE  Pt reports interest in additional practice w/ LH AE  Recommend further training w/ LH AE in future session  Pt seated OOB in chair at end of session with call bell and phone within reach  All needs met and pt reports no further questions for OT at this time  Continue to recommend Home OT when medically cleared  OT to follow pt on caseload      Additional Treatment Day 1   Recommendation   OT Discharge Recommendation Home OT   OT - OK to Discharge   (when medically cleared & pending stair training w/ PT)   Barthel Index   Feeding 10   Bathing 0   Grooming Score 5   Dressing Score 5   Bladder Score 10   Bowels Score 10   Toilet Use Score 5   Transfers (Bed/Chair) Score 10   Mobility (Level Surface) Score 0   Stairs Score 0   Barthel Index Score 55   Modified [place] Scale   Modified Jesse Scale 4 GOALS    1) Pt will improve activity tolerance to G for min 30 min txment sessions for increase engagement in functional tasks    2) Pt will complete bed mobility at a Mod I level w/ G balance/safety demonstrated to decrease caregiver assistance required     3) Pt will complete UB/LB dressing/self care w/ mod I using adaptive device and DME as needed    4) Pt will complete bathing w/ Mod I w/ use of AE and DME as needed    5) Pt will complete toileting w/ mod I w/ G hygiene/thoroughness using DME as needed    6) Pt will improve functional transfers to Mod I on/off all surfaces using DME as needed w/ G balance/safety     7) Pt will improve functional mobility during ADL/IADL/leisure tasks to Mod I using DME as needed w/ G balance/safety     8) Pt will be attentive 100% of the time during ongoing cognitive assessment w/ G participation to assist w/ safe d/c planning/recommendations    9) Pt will demonstrate G carryover of pt/caregiver education and training as appropriate w/o cues w/ good tolerance to increase safety during functional tasks      Paola Wooten, OTR/L

## 2020-02-20 NOTE — SOCIAL WORK
CM met with pt at bedside to discuss discharge needs  Pt lives in a house with his wife  ADL's are completed independently  Pt owns a RW; would like a BSC; CM will order  Pt was to go outpatient for therapy  Pt would like to start with home PT like he did last time  Pt used [facility] in the past and would like to use them again  CM will submit a referral and will place info on [facility]  Pt does drive and spouse will transport home  No other needs expressed or identified  CM will continue to follow as needed  A post acute care recommendation was made by your care team for [facility]  Discussed [place] [facility] with patient  List of agencies given to patient via in person  patient aware the list is custom filtered for them by preference  and that [facility]'s post acute providers are designated

## 2020-02-20 NOTE — PLAN OF CARE
Problem: PHYSICAL THERAPY ADULT  Goal: Performs mobility at highest level of function for planned discharge setting  See evaluation for individualized goals  Description  Treatment/Interventions: Functional transfer training, LE strengthening/ROM, Elevations, Therapeutic exercise, Endurance training, Patient/family training, Equipment eval/education, Bed mobility, Gait training, Compensatory technique education, Spoke to nursing  Equipment Recommended: Walker(has)       See flowsheet documentation for full assessment, interventions and recommendations  2/20/2020 1539 by Thom Parnell PTA  Outcome: Progressing  Note:   Prognosis: Good  Problem List: Decreased strength, Decreased range of motion, Decreased endurance, Impaired balance, Decreased mobility, Decreased coordination, Impaired judgement, Decreased safety awareness, Pain, Orthopedic restrictions  Assessment: Pt was able to progress gait distance to 30'x1 with RW and min assist x 1 this session  He does have difficulty clearing the floor with his left LE on occasional steps secondary to weakness in left LE  Pt's wife present for tx session   She wanted to take pt home today or at least she was under the impression that he was going home today  She states she is worried about how the pt will get into and out of her car to transport him home  She also reveals that he will be staying on the second floor and has a full flight of stairs to go up  At this time pt does not have the left LE strength to attempt stair training   Pt requires mod assist x 1 to transfer from supine to sit and he cannot actively move his left LE to assist in the transfer, so the wife would have to not only assist him in getting his upper body to sit position, she would also have to move his left LE at the same time   This amount of assistance required might put the pt's wife in a situation where she could hurt herself trying to help him transfer   Pt did perform LE exercises this session with more active recruitment of his muscles, however he still requires AAROM for most exercises  At this time pt would not be at the appropriate level to return home and requires further P T  For strengthening,endurance, transfer training, gait training , and stair training   Pt would benefit from STR at this time  Continue with current POC while in acute setting  Barriers to Discharge: Inaccessible home environment  Barriers to Discharge Comments: full flight of stairs  Recommendation: Short-term skilled PT(unless pt can improve transfers and complete stair training)     PT - OK to Discharge: Yes    See flowsheet documentation for full assessment

## 2020-02-20 NOTE — PHYSICAL THERAPY NOTE
02/20/20 1410   Pain Assessment   Pain Assessment 0-10   Pain Score 5   Pain Type Surgical pain   Pain Location Hip   Pain Orientation Left   Precautions   Total Hip Replacement ADduction; Internal rotation; Flexion   Restrictions/Precautions   Weight Bearing Precautions Per Order Yes   RUE Weight Bearing Per Order WBAT   LLE Weight Bearing Per Order WBAT   Other Precautions WBS; Fall Risk;Pain   General   Chart Reviewed Yes   Family/Caregiver Present Yes  (wife)   Cognition   Orientation Level Oriented X4   Subjective   Subjective pt agreeable to P T  tx this p m  Pt's wife present for therapy session  Bed Mobility   Supine to Sit 3  Moderate assistance   Additional items Assist x 1;LE management;Verbal cues  (Pt cannot move left LE  )   Additional Comments trialed wife assisting pt with supine to sit transfer and it was difficult for the pt to complete the transfer  Transfers   Sit to Stand 4  Minimal assistance   Additional items Assist x 1;Verbal cues  (for technique)   Stand to Sit 4  Minimal assistance   Additional items Assist x 1;Verbal cues;Armrests   Car transfer Unable to assess  (wife concerned about being able to assist pt )   Ambulation/Elevation   Gait pattern Improper Weight shift; Forward Flexion;Decreased foot clearance; Inconsistent meryl; Short stride; Step to;Excessively slow  (pt has difficulty clearing floor with some steps)   Gait Assistance 4  Minimal assist   Additional items Assist x 1;Verbal cues   Assistive Device Rolling walker   Distance 30'x1   Stair Management Assistance Not tested  (pt cannot lift left LE enough to attempt now)   Balance   Static Sitting Fair +   Dynamic Sitting Fair   Static Standing Fair -   Dynamic Standing Poor +   Ambulatory Poor +   Endurance Deficit   Endurance Deficit Yes   Endurance Deficit Description fatigue, pain   Activity Tolerance   Activity Tolerance Patient limited by fatigue;Patient limited by pain   Medical Staff Made Aware spoke with case manager   Nurse Made Aware yes  Pooja   Exercises   THR Supine;Sitting;20 reps;AROM;AAROM; Left   Assessment   Prognosis Good   Problem List Decreased strength;Decreased range of motion;Decreased endurance; Impaired balance;Decreased mobility; Decreased coordination; Impaired judgement;Decreased safety awareness;Pain;Orthopedic restrictions   Assessment Pt was able to progress gait distance to 30'x1 with RW and min assist x 1 this session  He does have difficulty clearing the floor with his left LE on occasional steps secondary to weakness in left LE  Pt's wife present for tx session   She wanted to take pt home today or at least she was under the impression that he was going home today  She states she is worried about how the pt will get into and out of her car to transport him home  She also reveals that he will be staying on the second floor and has a full flight of stairs to go up  At this time pt does not have the left LE strength to attempt stair training   Pt requires mod assist x 1 to transfer from supine to sit and he cannot actively move his left LE to assist in the transfer, so the wife would have to not only assist him in getting his upper body to sit position, she would also have to move his left LE at the same time   This amount of assistance required might put the pt's wife in a situation where she could hurt herself trying to help him transfer  Pt did perform LE exercises this session with more active recruitment of his muscles, however he still requires AAROM for most exercises  At this time pt would not be at the appropriate level to return home and requires further P T  For strengthening,endurance, transfer training, gait training , and stair training   Pt would benefit from STR at this time  Continue with current POC while in acute setting     Barriers to Discharge Inaccessible home environment   Barriers to Discharge Comments full flight of stairs   Goals   Patient Goals to get better   STG Expiration Date 02/23/20   PT Treatment Day 2   Plan   Treatment/Interventions Functional transfer training;LE strengthening/ROM; Elevations; Therapeutic exercise; Endurance training;Patient/family training;Equipment eval/education; Bed mobility;Gait training; Compensatory technique education;Continued evaluation;Spoke to nursing;Spoke to case management; Family   Progress Slow progress, decreased activity tolerance   PT Frequency 7x/wk; Twice a day   Recommendation   Recommendation Short-term skilled PT  (unless pt can improve transfers and complete stair training)   Equipment Recommended   (has own rolling walker)   PT - OK to Discharge Yes   Additional Comments to [address] , PTA

## 2020-02-20 NOTE — PROGRESS NOTES
Orthopedic Total Hip Progress Note      Subjective     Status post-Left total hip arthroplasty  Systemic or Specific Complaints: No Complaints    Objective     Vital signs in last 24 hours:  Temp:  [97 4 °F (36 3 °C)-98 1 °F (36 7 °C)] 98 °F (36 7 °C)  HR:  [60-93] 92  Resp:  [12-20] 18  BP: (101-140)/(57-91) 114/60    Neurovascular: Capillary refill: Normal  Dorsiflexion: 5/5  Plantarflexion:  5/5   Wound: Dressing:  clean/dry/intact   DVT Exam: No evidence of DVT seen on physical exam      Data Review:  CBC:   Lab Results   Component Value Date    WBC 8 41 01/27/2020    RBC 4 22 01/27/2020    HGB 10 2 (L) 02/20/2020    HCT 31 9 (L) 02/20/2020     01/27/2020       Assessment/Plan     Status post-Left total hip arthroplasty: Doing well postoperatively      Discharge today, Return to Clinic: as scheduled, if cleared by PT and Medicine      Activity: ad yasmani      Doc Schaefer PA-C    Date: 2/20/2020  Time: 7:05 AM

## 2020-02-20 NOTE — PLAN OF CARE
Problem: PHYSICAL THERAPY ADULT  Goal: Performs mobility at highest level of function for planned discharge setting  See evaluation for individualized goals  Description  Treatment/Interventions: Functional transfer training, LE strengthening/ROM, Elevations, Therapeutic exercise, Endurance training, Patient/family training, Equipment eval/education, Bed mobility, Gait training, Compensatory technique education, Spoke to nursing  Equipment Recommended: Walker(has)       See flowsheet documentation for full assessment, interventions and recommendations  Outcome: Progressing  Note:   Prognosis: Good  Problem List: Decreased strength, Decreased range of motion, Decreased endurance, Impaired balance, Decreased mobility, Decreased coordination, Decreased cognition, Impaired judgement, Decreased safety awareness, Pain, Orthopedic restrictions  Assessment: Pt was able to progress gait training distance to 15' with RW and min assist x 1 vs  Last tx session 5' x 1   Pt required AAROM for most exercises today secondary to weakness  Pt transfer supine to sit with mod assist x 1 , however he does attempt to assist   Pt goals are to go home and receive home P T  , Continue with current POC  Barriers to Discharge: None     Recommendation: Home with family support, Outpatient PT     PT - OK to Discharge: No    See flowsheet documentation for full assessment

## 2020-02-21 LAB
ANION GAP SERPL CALCULATED.3IONS-SCNC: 9 MMOL/L (ref 4–13)
BUN SERPL-MCNC: 10 MG/DL (ref 5–25)
CALCIUM SERPL-MCNC: 7.9 MG/DL (ref 8.3–10.1)
CHLORIDE SERPL-SCNC: 103 MMOL/L (ref 100–108)
CO2 SERPL-SCNC: 24 MMOL/L (ref 21–32)
CREAT SERPL-MCNC: 0.86 MG/DL (ref 0.6–1.3)
GFR SERPL CREATININE-BSD FRML MDRD: 83 ML/MIN/1.73SQ M
GLUCOSE SERPL-MCNC: 107 MG/DL (ref 65–140)
HCT VFR BLD AUTO: 30.9 % (ref 36.5–49.3)
HGB BLD-MCNC: 9.7 G/DL (ref 12–17)
POTASSIUM SERPL-SCNC: 3.6 MMOL/L (ref 3.5–5.3)
SODIUM SERPL-SCNC: 136 MMOL/L (ref 136–145)

## 2020-02-21 PROCEDURE — 97116 GAIT TRAINING THERAPY: CPT

## 2020-02-21 PROCEDURE — 80048 BASIC METABOLIC PNL TOTAL CA: CPT | Performed by: PHYSICIAN ASSISTANT

## 2020-02-21 PROCEDURE — 97530 THERAPEUTIC ACTIVITIES: CPT

## 2020-02-21 PROCEDURE — 97110 THERAPEUTIC EXERCISES: CPT

## 2020-02-21 PROCEDURE — 85014 HEMATOCRIT: CPT | Performed by: INTERNAL MEDICINE

## 2020-02-21 PROCEDURE — 85018 HEMOGLOBIN: CPT | Performed by: INTERNAL MEDICINE

## 2020-02-21 PROCEDURE — 97535 SELF CARE MNGMENT TRAINING: CPT

## 2020-02-21 RX ADMIN — APIXABAN 2.5 MG: 2.5 TABLET, FILM COATED ORAL at 17:10

## 2020-02-21 RX ADMIN — OXYCODONE HYDROCHLORIDE AND ACETAMINOPHEN 250 MG: 500 TABLET ORAL at 08:49

## 2020-02-21 RX ADMIN — OXYCODONE HYDROCHLORIDE 10 MG: 10 TABLET ORAL at 23:13

## 2020-02-21 RX ADMIN — CELECOXIB 200 MG: 200 CAPSULE ORAL at 08:48

## 2020-02-21 RX ADMIN — FERROUS SULFATE TAB 325 MG (65 MG ELEMENTAL FE) 325 MG: 325 (65 FE) TAB at 08:48

## 2020-02-21 RX ADMIN — TAMSULOSIN HYDROCHLORIDE 0.4 MG: 0.4 CAPSULE ORAL at 17:10

## 2020-02-21 RX ADMIN — FERROUS SULFATE TAB 325 MG (65 MG ELEMENTAL FE) 325 MG: 325 (65 FE) TAB at 17:10

## 2020-02-21 RX ADMIN — PANTOPRAZOLE SODIUM 40 MG: 40 TABLET, DELAYED RELEASE ORAL at 05:53

## 2020-02-21 RX ADMIN — SENNOSIDES 8.6 MG: 8.6 TABLET, FILM COATED ORAL at 08:49

## 2020-02-21 RX ADMIN — METHOCARBAMOL TABLETS 500 MG: 500 TABLET, COATED ORAL at 13:23

## 2020-02-21 RX ADMIN — OXYCODONE HYDROCHLORIDE 5 MG: 5 TABLET ORAL at 13:23

## 2020-02-21 RX ADMIN — OXYCODONE HYDROCHLORIDE 10 MG: 10 TABLET ORAL at 08:49

## 2020-02-21 RX ADMIN — ATORVASTATIN CALCIUM 20 MG: 20 TABLET, FILM COATED ORAL at 17:11

## 2020-02-21 RX ADMIN — APIXABAN 2.5 MG: 2.5 TABLET, FILM COATED ORAL at 08:49

## 2020-02-21 NOTE — OCCUPATIONAL THERAPY NOTE
Occupational Therapy Treatment Note:    2 tx sessions [id number] am [id number] am     02/21/20 1105   Restrictions/Precautions   Weight Bearing Precautions Per Order Yes   RUE Weight Bearing Per Order WBAT   LLE Weight Bearing Per Order WBAT   Other Precautions WBS; Fall Risk;Pain   Pain Assessment   Pain Assessment 0-10   Pain Score 3   Pain Type Surgical pain   Pain Location Hip   Pain Orientation Left   ADL   Where Assessed Chair   Grooming Assistance 5  Supervision/Setup   Grooming Deficit Setup   LB Dressing Assistance 3  Moderate Assistance   LB Dressing Deficit Setup; Requires assistive device for steadying;Steadying;Verbal cueing;Supervision/safety; Increased time to complete; Don/doff R sock; Don/doff L sock; Thread LLE into pants; Thread RLE into pants;Pull up over hips   LB Dressing Comments Pt with inconsistent carry over of use of LHAE with activity  Functional Standing Tolerance   Time 6 mins   Activity dynamic stand balance activity  Comments Pt with increased fatigue with activity  Slow controlled movements noted  Bed Mobility   Supine to Sit 3  Moderate assistance   Additional items Assist x 1;Bedrails; Increased time required;Verbal cues;LE management   Additional Comments cues for appropriate hand placement required  Transfers   Sit to Stand 4  Minimal assistance   Additional items Assist x 1; Armrests; Increased time required;Verbal cues;HOB elevated; Bedrails   Stand to Sit 4  Minimal assistance   Additional items Assist x 1; Armrests; Bedrails; Increased time required;Verbal cues   Stand pivot 4  Minimal assistance   Additional items Assist x 1; Armrests; Increased time required;Verbal cues; Bedrails   Toilet transfer 4  Minimal assistance   Additional items Assist x 1; Armrests; Increased time required;Verbal cues; Commode   Additional Comments cues for safe hand placement and footing required      Functional Mobility   Functional Mobility 4  Minimal assistance   Additional Comments x1   Additional items Rolling walker   Cognition   Overall Cognitive Status WFL   Arousal/Participation Alert; Responsive; Cooperative   Attention Attends with cues to redirect   Orientation Level Oriented X4   Memory Decreased recall of precautions;Decreased short term memory;Decreased recall of recent events   Following Commands Follows one step commands without difficulty   Comments Pt unable to recall yesterday's tx session with OT  Pt without inital recall of review of LHAE  Additional Activities   Additional Activities Other (Comment)  (reviewed current plan of care  )   Additional Activities Comments Spoke to Pt and wife regarding current assistance levels noted  Pt's wife reports having concerns that Pt is, "worse", than last time around  Activity Tolerance   Activity Tolerance Patient limited by pain; Patient limited by fatigue   Medical Staff Made Aware reported all findings to nursing staff  Assessment   Assessment Pt was seen for skilled OT with focus on completion of self care tasks, functional transfers, review of hip precautions, fall prevention, LE dressing with use of LHAE and review of current plan of care  Extended time provided for Pt to self express regarding current functional status  Pt's wife was present during tx session and reports hoping Pt gets stronger before coming home  Mod A x1 to achieve EOB positioning  Pt required step by step verbal cues to maintain hip precaution, bending with rise and descent  Mod A with trial of use of LHAE  Pt with limited focus to tasks warranting cues for safety through out tx session  Spoke with Pt's wife regarding Pt possibly having hx of memory problems  Pt's wife stated, "I'm the one that forgets all the time"  See above levels of A required for all functional tasks  Pt may benefit from further rehab with focus on achieving optimal performance levels with all functional tasks pending progression of current goals due to noted deficits      Plan   Treatment Interventions ADL retraining;Functional transfer training;Cognitive reorientation; Endurance training   Goal Expiration Date 03/01/20   OT Treatment Day 2   OT Frequency 3-5x/wk   Recommendation   OT Discharge Recommendation Short Term Rehab  (vs home with home therapies pending progression of goals  )   OT - OK to Discharge Yes  (when medically cleared )   Barthel Index   Feeding 10   Bathing 0   Grooming Score 5   Dressing Score 5   Bladder Score 10   Bowels Score 10   Toilet Use Score 5   Transfers (Bed/Chair) Score 10   Mobility (Level Surface) Score 0   Stairs Score 0   Barthel Index Score 55   Modified [place] Scale   Modified [place] Scale 4

## 2020-02-21 NOTE — PLAN OF CARE
Problem: PHYSICAL THERAPY ADULT  Goal: Performs mobility at highest level of function for planned discharge setting  See evaluation for individualized goals  Description  Treatment/Interventions: Functional transfer training, LE strengthening/ROM, Elevations, Therapeutic exercise, Endurance training, Patient/family training, Equipment eval/education, Bed mobility, Gait training, Compensatory technique education, Spoke to nursing  Equipment Recommended: Walker(has)       See flowsheet documentation for full assessment, interventions and recommendations  2/21/2020 1800 by Madeline Patel PTA  Outcome: Progressing  Note:   Prognosis: Good  Problem List: Decreased strength, Decreased range of motion, Decreased endurance, Impaired balance, Decreased mobility, Decreased safety awareness, Impaired hearing, Decreased skin integrity, Orthopedic restrictions, Pain  Assessment: PT continues to demonstrate impaired ability to perform bed mobility, transfers and ambulation on levels surfaces  Pt is requiring assistance, max verbal cues for safe and proper bed mobility, transfers and gait training techniques, cues to maintain THP's with transfers, cues for proper  positioning and placement of L le during transfers, cues for safe descent, proper  le sequencing and turning techniques  Pt demonstrates gait deviations of  Slow,step to, antalgic, wbos, forward flexed posture, lateral sway,decreased balance requiring min assist x1 for safe mobility  Guarding and decreased fluency noted during for gait training trials and improved fluency with less guarding noted with 2nd gait trial   Pt at times has tendency to kick L leg of Rw with l le increasing pt 's risk for falls  Pt instructed o decreased JUAN  Pt progressed with ambulation distances to 80' x2 with one prolonged standing rest breaks  Noted unsteadiness and decreased balance when making turns or turning around with use of rw      Pt requires increased time for all aspects of mobility  Pt requires assistance for hip abd /add , and heel slides, aa-arom saq and aa-a laq x 15 reps  Pt  Remained seated out of bed in chair with ice to L hip and scd's to b/l le's, chair alarm activated  Recommend continued inpt PT inorder to progress mobility, strength,ROM, balance, activity tolerance, and safety to maximize fucntional outcomes, mobility and independence  Pt needs to progress with all aspects of mobility and progress to stair climbing and achieve Pt goals prior to d/c to home, if pt unable to then may benefit from STR at d/c    Barriers to Discharge: Inaccessible home environment  Barriers to Discharge Comments: full flight of stairs  Recommendation: Short-term skilled PT, Home PT, Home with family support( STR vs home with family support and HHPT pending progress  )     PT - OK to Discharge: No(to home, yes to STR)    See flowsheet documentation for full assessment

## 2020-02-21 NOTE — PLAN OF CARE
Problem: OCCUPATIONAL THERAPY ADULT  Goal: Performs self-care activities at highest level of function for planned discharge setting  See evaluation for individualized goals  Description  Treatment Interventions: ADL retraining, Functional transfer training, UE strengthening/ROM, Endurance training, Patient/family training, Equipment evaluation/education, Compensatory technique education, Activityengagement          See flowsheet documentation for full assessment, interventions and recommendations  2/21/2020 1403 by TJ Paredes  Outcome: Progressing  Note:   Limitation: Decreased ADL status, Decreased endurance, Decreased self-care trans, Decreased high-level ADLs  Prognosis: Good  Assessment: Pt was seen for skilled OT with focus on completion of self care tasks, functional transfers, review of hip precautions, fall prevention, LE dressing with use of LHAE and review of current plan of care  Extended time provided for Pt to self express regarding current functional status  Pt's wife was present during tx session and reports hoping Pt gets stronger before coming home  Mod A x1 to achieve EOB positioning  Pt required step by step verbal cues to maintain hip precaution, bending with rise and descent  Mod A with trial of use of LHAE  Pt with limited focus to tasks warranting cues for safety through out tx session  Spoke with Pt's wife regarding Pt possibly having hx of memory problems  Pt's wife stated, "I'm the one that forgets all the time"  See above levels of A required for all functional tasks  Pt may benefit from further rehab with focus on achieving optimal performance levels with all functional tasks pending progression of current goals due to noted deficits        OT Discharge Recommendation: Short Term Rehab(vs home with home therapies pending progression of goals  )  OT - OK to Discharge: Yes(when medically cleared )    2/21/2020 1402 by TJ Paredes  Outcome: Progressing  Note: Limitation: Decreased ADL status, Decreased endurance, Decreased self-care trans, Decreased high-level ADLs  Prognosis: Good  Assessment: Pt was seen for skilled OT with focus on completion of self care tasks, functional transfers, review of hip precautions, fall prevention, LE dressing with use of LHAE and review of current plan of care  Extended time provided for Pt to self express regarding current functional status  Pt's wife was present during tx session and reports hoping Pt gets stronger before coming home  Mod A x1 to achieve EOB positioning  Pt required step by step verbal cues to maintain hip precaution, bending with rise and descent  Mod A with trial of use of LHAE  Pt with limited focus to tasks warranting cues for safety through out tx session  Spoke with Pt's wife regarding Pt possibly having hx of memory problems  Pt's wife stated, "I'm the one that forgets all the time"  See above levels of A required for all functional tasks  Pt may benefit from further rehab with focus on achieving optimal performance levels with all functional tasks pending progression of current goals due to noted deficits        OT Discharge Recommendation: Short Term Rehab(vs home with home therapies pending progression of goals  )  OT - OK to Discharge: Yes(when medically cleared )

## 2020-02-21 NOTE — PHYSICAL THERAPY NOTE
Physical Therapy Treatment Note     02/21/20 1103   Pain Assessment   Pain Assessment 0-10   Pain Score 3   Pain Type Surgical pain   Pain Location Hip   Pain Orientation Left   Hospital Pain Intervention(s) Repositioned;Cold applied; Ambulation/increased activity; Emotional support; Rest   Response to Interventions tolerated PT   Precautions   Total Hip Replacement Flexion  (anterior THR - flexion per order)   Restrictions/Precautions   LLE Weight Bearing Per Order WBAT   Other Precautions WBS;THR;Fall Risk;Pain; Chair Alarm   General   Chart Reviewed Yes   Family/Caregiver Present   (wife)   Cognition   Arousal/Participation Cooperative   Attention Attends with cues to redirect   Orientation Level Oriented X4   Memory Decreased recall of precautions;Decreased recall of recent events;Decreased short term memory   Following Commands Follows one step commands with increased time or repetition   Subjective   Subjective " My Bp has been running low "     Bed Mobility   Supine to Sit 3  Moderate assistance   Additional items Assist x 1; Increased time required;Verbal cues;LE management; Bedrails   Additional Comments cues for proper bed mobility technques and handplacement to assit with scooting toward edge of bed  Transfers   Sit to Stand 4  Minimal assistance   Additional items Assist x 1; Increased time required;Verbal cues   Stand to Sit 4  Minimal assistance   Additional items Assist x 1; Armrests; Increased time required;Verbal cues   Stand pivot 4  Minimal assistance   Additional items Assist x 1; Increased time required;Verbal cues   Additional Comments cuesf or handplacement, proper positioning of L paulo ease of descent and bodypositioning and cues to maintain flexion precaution   Ambulation/Elevation   Gait pattern Improper Weight shift; Antalgic;Decreased foot clearance;Decreased L stance; Wide JUAN; Short stride; Step to;Excessively slow   Gait Assistance 4  Minimal assist   Additional items Assist x 1;Verbal cues Assistive Device Rolling walker   Distance 60' x2   Stair Management Assistance Not tested  (due to fatigue, pain and decreased safety   )   Balance   Static Sitting Fair +   Dynamic Sitting Fair -   Static Standing Poor +   Dynamic Standing Poor +   Ambulatory Poor +   Endurance Deficit   Endurance Deficit Yes   Endurance Deficit Description fatigue, pain,    Activity Tolerance   Activity Tolerance Patient limited by pain; Patient limited by fatigue  (bp sup 108/56, sit96/67, stnd 112/69, sit post amb 103/66)   Medical Staff Made Aware mk [place]   Nurse Made Aware yes   Exercises   THR Supine;Sitting;10 reps;AAROM;AROM; Left;Bilateral   Equipment Use   Comments pt ed on transfers, bed mobility, car transfers, discussed  PT pOC and need to achieve PT goals proer to d/c  Assessment   Prognosis Good   Problem List Decreased strength;Decreased range of motion;Decreased endurance; Impaired balance;Decreased mobility; Decreased safety awareness; Impaired hearing;Decreased skin integrity;Orthopedic restrictions;Pain   Assessment PT continues to demonstrates impaired ability to perform bed mobility, transfers and ambulation on levels surfaces  Pt is requiring assistance, max verbal cues for safe and proper bed mobility, transfers and gait training techniques, cues to maintain THP's with transfers, cues for proper  positioning and placement of L le during transfers, cues for safe descent, proper  le sequencing and turning techniques  Pt demonstrates gait deviations of  Slow,step to, antalgic, wbos, forward flexed posture, lateral sway,decreased balance requiring min assist x1 for safe mobility  Pt requires increased time for all aspects of mobility  Pt requires assistance for hip abd /add , and heel slides, aa-arom saq and aa-a laq x 10 reps  Pt able to recall no flexion>90* hip precaution  Pt  Remained seated out of bed in chair with ice to L hip and scd's to b/l le's, chair alarm activated  Recommend continued inpt PT inorder to progress mobility, strength,ROM, balance, activity tolerance, and safety to maximize fucntional outcomes, mobility and independence  PT needs progress with all aspects of mobility and progress to stair climbing and achieve Pt goals prior to d/c to home, if pt unable to to then may benefit from STR at d/c  Goals   Patient Goals " to get better and go home "     [facility] Expiration Date 02/23/20   PT Treatment Day 3   Plan   Treatment/Interventions Functional transfer training;LE strengthening/ROM; Therapeutic exercise; Endurance training;Patient/family training;Equipment eval/education; Bed mobility;Gait training;Spoke to nursing;OT;Family   Progress Slow progress, decreased activity tolerance   PT Frequency 7x/wk; Twice a day;Weekend   Recommendation   Recommendation Short-term skilled PT; Home PT; Home with family support  (STR vs home with family support and HHPT pending progress   )   PT - OK to Discharge No  (to home, yes to STR)         Yeni Cooper, PTA

## 2020-02-21 NOTE — PROGRESS NOTES
Orthopedic Total Hip Progress Note      Subjective     Status post-Left total hip arthroplasty  Systemic or Specific Complaints: No Complaints    Objective     Vital signs in last 24 hours:  Temp:  [96 °F (35 6 °C)-98 8 °F (37 1 °C)] 96 °F (35 6 °C)  HR:  [] 89  Resp:  [16-20] 16  BP: ()/(52-89) 96/70    Neurovascular: Capillary refill: Normal  Dorsiflexion: 5/5  Plantarflexion:  5/5   Wound: Dressing:  clean/dry/intact   DVT Exam: No evidence of DVT seen on physical exam      Data Review:  CBC:   Lab Results   Component Value Date    WBC 8 41 01/27/2020    RBC 4 22 01/27/2020    HGB 9 7 (L) 02/21/2020    HCT 30 9 (L) 02/21/2020     01/27/2020       Assessment/Plan     Status post-Left total hip arthroplasty: Doing well postoperatively      Discharge today, Return to Clinic: as scheduled, if cleared by PT and Medicine      Activity: ad yasmani      Troy Callahan PA-C    Date: 2/21/2020  Time: 8:30 AM

## 2020-02-21 NOTE — PROGRESS NOTES
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  Ambulatory dysfunction      Recommendation:    Patient did not clear physical therapy yesterday, continue efforts to improve his ambulation mobility and endurance  If patient is cleared by OT PT and by orthopedic team then she may be able to go home later today with his family  Continue current treatment including Eliquis with close monitoring for any blood loss  He fully understands to follow with his primary care physician and Cardiology  Full orthostatic precautions particularly with his use of Flomax and Cardizem  If he has any diminution of his blood pressure or heart rate then Cardizem dose will need to be adjusted  Currently he is tolerating it well  I have encouraged him to increase his intake of fluids to minimize any risk of dehydration and causing any tachycardias  Continue ferrous sulfate with regards to his blood loss anemia  Subjective:     Patient seen and examined today  EMR and overnight events reviewed  Resting comfortably eating his breakfast not in any pain discomfort denies any bleeding discharge or drainage no chest pain denies any palpitation diaphoresis or syncope  He is awaiting therapy indicates that he might be able to go home if he does well with steps which she was unable to do yesterday    Pain is mild rated 3/10 on numeric pain scale which improved with pain medications to 0/10 last night  Review of Systems     Constitutional: Denies appetite change  No chills, diaphoresis, fatigue or fever  HEENT: No congestion, sore throat  No visual complaints  Respiratory: No cough, chest tightness, shortness of breath and wheezing  Cardiovascular: No chest pain and palpitations  No Syncope or grey out  GI: Negative for abdominal distention, pain, constipation, diarrhea or nausea  Genitourinary: Negative for decreased urine volume, dysuria, flank pain and urgency  Skin: Negative for rash  Neurological: Negative for dizziness, weakness, numbness and headaches  Hematological: Negative for spontaneous bruising or bleeding  Psychiatric: Negative for confusion, dysphoric mood, hallucinations  All other systems reviewed and are negative  OBJECTIVE:     Vitals:     Temp:  [97 8 °F (36 6 °C)-98 8 °F (37 1 °C)] 97 8 °F (36 6 °C)  HR:  [] 91  Resp:  [18-20] 20  BP: ()/(52-89) 99/59  SpO2:  [94 %-98 %] 95 %  Temp (24hrs), Av 1 °F (36 7 °C), Min:97 8 °F (36 6 °C), Max:98 8 °F (37 1 °C)  Current: Temperature: 97 8 °F (36 6 °C)    Intake/Output Summary (Last 24 hours) at 2020 0735  Last data filed at 2020 0300  Gross per 24 hour   Intake    Output 600 ml   Net -600 ml     Body mass index is 26 49 kg/m²  Physical Exam    Awake alert oriented cooperative  Pallor JVP cyanosis negative  HEENT normocephalic atraumatic  Clear lungs no rales or crackles  Irregularly regular S1-S2 not fast, 2/6 ejection systolic murmur left ventricular apex  Abdomen is soft scaphoid nontender  Bowel sounds are present  Clean dressing is noted on his operated left hip area with surrounding ice packs    No discharge no drainage  No dehiscence and no erythema is noted range of motion is improved from yesterday to hip flexion  Bilateral Homans sign is negative there is no cough tenderness and distal pulses were intact  Skin is dry and warm      Labs, Imaging, & Other studies: All pertinent labs and imaging studies were personally reviewed  Results from last 7 days   Lab Units 02/21/20  0423 02/20/20  0437   HEMOGLOBIN g/dL 9 7* 10 2*     Results from last 7 days   Lab Units 02/21/20  0423   POTASSIUM mmol/L 3 6   CHLORIDE mmol/L 103   CO2 mmol/L 24   BUN mg/dL 10   CREATININE mg/dL 0 86   EGFR ml/min/1 73sq m 83   CALCIUM mg/dL 7 9*           Current Meds:  Current Facility-Administered Medications   Medication Dose Route Frequency Provider Last Rate Last Dose    acetaminophen (TYLENOL) tablet 650 mg  650 mg Oral Q6H PRN Yordan Stevens PA-C        apixaban (ELIQUIS) tablet 2 5 mg  2 5 mg Oral BID Yordan Stevens PA-C   2 5 mg at 02/20/20 1801    ascorbic acid (VITAMIN C) tablet 250 mg  250 mg Oral Daily Fred Shah MD   250 mg at 02/20/20 2125    atorvastatin (LIPITOR) tablet 20 mg  20 mg Oral Once per day on Mon Wed Fri Kam Beverly PA-C   20 mg at 02/19/20 1531    calcium carbonate (TUMS) chewable tablet 1,000 mg  1,000 mg Oral Daily PRN Yordan Stevens PA-C        celecoxib (CeleBREX) capsule 200 mg  200 mg Oral Daily Yordan Stevens PA-C   200 mg at 02/20/20 0912    diltiazem (CARDIZEM CD) 24 hr capsule 180 mg  180 mg Oral Daily Yordan Stevens PA-C   180 mg at 02/20/20 9701    ferrous sulfate tablet 325 mg  325 mg Oral BID With Meals Fred Shah MD   325 mg at 02/20/20 2125    HYDROmorphone (DILAUDID) injection 0 5 mg  0 5 mg Intravenous Q2H PRN Yordan Stevens PA-C        lactated ringers infusion  125 mL/hr Intravenous Continuous Yordan Stevens PA-C   Stopped at 02/20/20 6603    methocarbamol (ROBAXIN) tablet 500 mg  500 mg Oral Q6H PRN Yordan Stevens PA-C   500 mg at 02/20/20 0724    ondansetron (ZOFRAN) injection 4 mg  4 mg Intravenous Q6H PRN Yordan Stevens PA-C        oxyCODONE (ROXICODONE) immediate release tablet 10 mg  10 mg Oral Q4H PRN Yordan Stevens PA-C   10 mg at 02/20/20 1210    oxyCODONE (ROXICODONE) IR tablet 5 mg  5 mg Oral Q4H PRN RHONDA Hawley-C   5 mg at 02/20/20 1656    pantoprazole (PROTONIX) EC tablet 40 mg  40 mg Oral Daily Before Breakfast DLEMI HawleyC   40 mg at 02/21/20 2458    senna (SENOKOT) tablet 8 6 mg  1 tablet Oral Daily RHONDA Hawley-C   8 6 mg at 02/20/20 0912    simethicone (MYLICON) chewable tablet 80 mg  80 mg Oral 4x Daily PRN RHONDA Hawley-C        sodium chloride 0 9 % infusion  125 mL/hr Intravenous Continuous Kam Hill DO   Stopped at 02/19/20 1317    tamsulosin (FLOMAX) capsule 0 4 mg  0 4 mg Oral Daily With Dar Jarrell PA-C   0 4 mg at 02/20/20 1656    traMADol (ULTRAM) tablet 50 mg  50 mg Oral Q6H PRN Lenny Mabry PA-C         Home Meds:  Medications Prior to Admission   Medication    apixaban (ELIQUIS) 2 5 mg    atorvastatin (LIPITOR) 20 mg tablet    cholecalciferol (VITAMIN D3) 1,000 units tablet    diltiazem (CARDIZEM CD) 180 mg 24 hr capsule    Multiple Vitamins-Minerals (MULTIVITAMIN WITH MINERALS) tablet    tamsulosin (FLOMAX) 0 4 mg       Continuous Infusions:    lactated ringers 125 mL/hr Last Rate: Stopped (02/20/20 0718)   sodium chloride 125 mL/hr Last Rate: Stopped (02/19/20 1317)       Invasive Devices     Peripheral Intravenous Line            Peripheral IV 02/19/20 Left Hand 1 day                VTE Pharmacologic Prophylaxis: Eliquis as per Primary Ortho Team   VTE Mechanical Prophylaxis: sequential compression device    Portions of the record may have been created with voice recognition software  Occasional wrong word or "sound a like" substitutions may have occurred due to the inherent limitations of voice recognition software  Read the chart carefully and recognize, using context, where substitutions have occurred

## 2020-02-21 NOTE — PLAN OF CARE
Problem: PHYSICAL THERAPY ADULT  Goal: Performs mobility at highest level of function for planned discharge setting  See evaluation for individualized goals  Description  Treatment/Interventions: Functional transfer training, LE strengthening/ROM, Elevations, Therapeutic exercise, Endurance training, Patient/family training, Equipment eval/education, Bed mobility, Gait training, Compensatory technique education, Spoke to nursing  Equipment Recommended: Walker(has)       See flowsheet documentation for full assessment, interventions and recommendations  Outcome: Progressing  Note:   Prognosis: Good  Problem List: Decreased strength, Decreased range of motion, Decreased endurance, Impaired balance, Decreased mobility, Decreased safety awareness, Impaired hearing, Decreased skin integrity, Orthopedic restrictions, Pain  Assessment: PT continues to demonstrates impaired ability to perform bed mobility, transfers and ambulation on levels surfaces  Pt is requiring assistance, max verbal cues for safe and proper bed mobility, transfers and gait training techniques, cues to maintain THP's with transfers, cues for proper  positioning and placement of L le during transfers, cues for safe descent, proper  le sequencing and turning techniques  Pt demonstrates gait deviations of  Slow,step to, antalgic, wbos, forward flexed posture, lateral sway,decreased balance requiring min assist x1 for safe mobility  Pt requires increased time for all aspects of mobility  Pt requires assistance for hip abd /add , and heel slides, aa-arom saq and aa-a laq x 10 reps  Pt able to recall no flexion>90* hip precaution  Pt  Remained seated out of bed in chair with ice to L hip and scd's to b/l le's, chair alarm activated  Recommend continued inpt PT inorder to progress mobility, strength,ROM, balance, activity tolerance, and safety to maximize fucntional outcomes, mobility and independence   PT needs progress with all aspects of mobility and progress to stair climbing and achieve Pt goals prior to d/c to home, if pt unable to to then may benefit from STR at d/c    Barriers to Discharge: Inaccessible home environment  Barriers to Discharge Comments: full flight of stairs  Recommendation: Short-term skilled PT, Home PT, Home with family support(STR vs home with family support and HHPT pending progress  )     PT - OK to Discharge: No(to home, yes to STR)    See flowsheet documentation for full assessment

## 2020-02-21 NOTE — PHYSICAL THERAPY NOTE
Physical Therapy Treatment Note     02/21/20 9377   Pain Assessment   Pain Assessment 0-10   Pain Score 4   Pain Type Surgical pain   Pain Location Hip   Pain Orientation Left   Hospital Pain Intervention(s) Repositioned;Cold applied; Ambulation/increased activity; Emotional support; Rest   Response to Interventions tolerated PT   Precautions   Total Hip Replacement Flexion  ( anterior THR - flexion per order)   Restrictions/Precautions   LLE Weight Bearing Per Order WBAT   Other Precautions WBS;THR;Fall Risk;Pain;Hard of hearing   General   Family/Caregiver Present Yes  (i)   Subjective   Subjective PT reports walking to the bathroom and getting abckt o bed with help of his wife  Pt and pt 's wife instructed to call for staff for assistance with mobility  Bed Mobility   Supine to Sit 5  Supervision   Additional items Assist x 1;HOB elevated; Bedrails; Increased time required;Verbal cues;LE management  (pt self assists in manuvering of l le with ue's  )   Transfers   Sit to Stand 4  Minimal assistance   Additional items Assist x 1; Armrests; Increased time required;Verbal cues  (bed height elevated   )   Stand to Sit 5  Supervision   Additional items Assist x 1; Armrests; Increased time required;Verbal cues   Additional Comments cuesf or handplacement, positioning and placement of l le and ease of descent  cues for upright posture with sit to stand  Ambulation/Elevation   Gait pattern Improper Weight shift; Wide JUAN; Forward Flexion; Short stride; Step to;Excessively slow; Antalgic;Decreased L stance  (progressing to step through gait pattern   )   Gait Assistance 4  Minimal assist  (min)   Additional items Assist x 1;Verbal cues   Assistive Device Rolling walker   Distance 90'x1   Stair Management Assistance Not tested  (due to fatigue, pain and decreased safety  )   Balance   Static Sitting Fair +   Static Standing Fair -   Dynamic Standing Poor +   Ambulatory Poor +   Endurance Deficit   Endurance Deficit Description fatigue, pain   Activity Tolerance   Activity Tolerance Patient limited by pain; Patient limited by fatigue   Medical Staff Made Aware Minal MANN   Exercises   THR Supine;15 reps;AAROM;AROM; Left;Bilateral  (a/a hip abd /ad, a/a hs, a/a-a saq)   Assessment   Prognosis Good   Problem List Decreased strength;Decreased range of motion;Decreased endurance; Impaired balance;Decreased mobility; Decreased safety awareness; Impaired hearing;Decreased skin integrity;Orthopedic restrictions;Pain   Assessment PT continues to demonstrate impaired ability to perform bed mobility, transfers and ambulation on levels surfaces   Pt is requiring assistance, max verbal cues for safe and proper bed mobility, transfers and gait training techniques, cues to maintain THP's with transfers, cues for proper  positioning and placement of L le during transfers, cues for safe descent, proper  le sequencing and turning techniques   Pt demonstrates gait deviations of  Slow,step to, antalgic, wbos, forward flexed posture, lateral sway,decreased balance requiring min assist x1 for safe mobility  Guarding and decreased fluency noted during for gait training trials and improved fluency with less guarding noted with 2nd gait trial   Pt at times has tendency to kick L leg of Rw with l le increasing pt 's risk for falls  Pt instructed o decreased JUAN  Pt progressed with ambulation distances to 80' x2 with one prolonged standing rest breaks  Noted unsteadiness and decreased balance when making turns or turning around with use of rw      Pt requires increased time for all aspects of mobility   Pt requires assistance for hip abd /add , and heel slides, aa-arom saq and aa-a laq x 15 reps   Pt  Remained seated out of bed in chair with ice to L hip and scd's to b/l le's, chair alarm activated   Recommend continued inpt PT inorder to progress mobility, strength,ROM, balance, activity tolerance, and safety to maximize fucntional outcomes, mobility and independence  Pt needs to progress with all aspects of mobility and progress to stair climbing and achieve Pt goals prior to d/c to home, if pt unable to then may benefit from STR at d/c  Goals   Patient Goals " to go home "     [facility] Expiration Date 02/23/20   PT Treatment Day 4   Plan   Treatment/Interventions Functional transfer training;LE strengthening/ROM; Therapeutic exercise; Endurance training;Patient/family training;Equipment eval/education; Bed mobility;Gait training;Spoke to nursing;Family   Progress Progressing toward goals   PT Frequency 7x/wk; Twice a day;Weekend   Recommendation   Recommendation Short-term skilled PT; Home PT; Home with family support  ( STR vs home with family support and HHPT pending progress  )   PT - OK to Discharge No  (to home, yes to STR)        02/21/20 8024   Pain Assessment   Pain Assessment 0-10   Pain Score 4   Pain Type Surgical pain   Pain Location Hip   Pain Orientation Left   Hospital Pain Intervention(s) Repositioned;Cold applied; Ambulation/increased activity; Emotional support; Rest   Response to Interventions tolerated PT   Precautions   Total Hip Replacement Flexion  ( anterior THR - flexion per order)   Restrictions/Precautions   LLE Weight Bearing Per Order WBAT   Other Precautions WBS;THR;Fall Risk;Pain;Hard of hearing   General   Family/Caregiver Present Yes  (i)   Subjective   Subjective PT reports walking to the bathroom and getting abckt o bed with help of his wife  Pt and pt 's wife instructed to call for staff for assistance with mobility  Bed Mobility   Supine to Sit 5  Supervision   Additional items Assist x 1;HOB elevated; Bedrails; Increased time required;Verbal cues;LE management  (pt self assists in manuvering of l le with ue's  )   Transfers   Sit to Stand 4  Minimal assistance   Additional items Assist x 1; Armrests; Increased time required;Verbal cues  (bed height elevated   )   Stand to Sit 5  Supervision   Additional items Assist x 1;Armrests; Increased time required;Verbal cues   Additional Comments cuesf or handplacement, positioning and placement of l le and ease of descent  cues for upright posture with sit to stand  Ambulation/Elevation   Gait pattern Improper Weight shift; Wide JUAN; Forward Flexion; Short stride; Step to;Excessively slow; Antalgic;Decreased L stance  (progressing to step through gait pattern   )   Gait Assistance 4  Minimal assist  (min)   Additional items Assist x 1;Verbal cues   Assistive Device Rolling walker   Distance 90'x1   Stair Management Assistance Not tested  (due to fatigue, pain and decreased safety  )   Balance   Static Sitting Fair +   Static Standing Fair -   Dynamic Standing Poor +   Ambulatory Poor +   Endurance Deficit   Endurance Deficit Description fatigue, pain   Activity Tolerance   Activity Tolerance Patient limited by pain; Patient limited by fatigue   Medical Staff Made Aware Minal MANN   Exercises   THR Supine;15 reps;AAROM;AROM; Left;Bilateral  (a/a hip abd /ad, a/a hs, a/a-a saq)   Assessment   Prognosis Good   Problem List Decreased strength;Decreased range of motion;Decreased endurance; Impaired balance;Decreased mobility; Decreased safety awareness; Impaired hearing;Decreased skin integrity;Orthopedic restrictions;Pain   Assessment PT continues to demonstrate impaired ability to perform bed mobility, transfers and ambulation on levels surfaces   Pt is requiring assistance, max verbal cues for safe and proper bed mobility, transfers and gait training techniques, cues to maintain THP's with transfers, cues for proper  positioning and placement of L le during transfers, cues for safe descent, proper  le sequencing and turning techniques   Pt demonstrates gait deviations of  Slow,step to, antalgic, wbos, forward flexed posture, lateral sway,decreased balance requiring min assist x1 for safe mobility    Guarding and decreased fluency noted during for gait training trials and improved fluency with less guarding noted with 2nd gait trial   Pt at times has tendency to kick L leg of Rw with l le increasing pt 's risk for falls  Pt instructed o decreased JUAN  Pt progressed with ambulation distances to 80' x2 with one prolonged standing rest breaks  Noted unsteadiness and decreased balance when making turns or turning around with use of rw  Pt requires increased time for all aspects of mobility   Pt requires assistance for hip abd /add , and heel slides, aa-arom saq and aa-a laq x 15 reps   Pt  Remained seated out of bed in chair with ice to L hip and scd's to b/l le's, chair alarm activated   Recommend continued inpt PT inorder to progress mobility, strength,ROM, balance, activity tolerance, and safety to maximize fucntional outcomes, mobility and independence  Pt needs to progress with all aspects of mobility and progress to stair climbing and achieve Pt goals prior to d/c to home, if pt unable to then may benefit from STR at d/c  Goals   Patient Goals " to go home "     [facility] Expiration Date 02/23/20   PT Treatment Day 4   Plan   Treatment/Interventions Functional transfer training;LE strengthening/ROM; Therapeutic exercise; Endurance training;Patient/family training;Equipment eval/education; Bed mobility;Gait training;Spoke to nursing;Family   Progress Progressing toward goals   PT Frequency 7x/wk; Twice a day;Weekend   Recommendation   Recommendation Short-term skilled PT; Home PT; Home with family support  ( STR vs home with family support and HHPT pending progress   )   PT - OK to Discharge No  (to home, yes to STR)       Brayan Ogden, PTA

## 2020-02-22 VITALS
BODY MASS INDEX: 26.61 KG/M2 | HEART RATE: 108 BPM | OXYGEN SATURATION: 100 % | TEMPERATURE: 97.2 F | DIASTOLIC BLOOD PRESSURE: 59 MMHG | HEIGHT: 72 IN | WEIGHT: 196.43 LBS | RESPIRATION RATE: 20 BRPM | SYSTOLIC BLOOD PRESSURE: 107 MMHG

## 2020-02-22 LAB
ANION GAP SERPL CALCULATED.3IONS-SCNC: 7 MMOL/L (ref 4–13)
BUN SERPL-MCNC: 9 MG/DL (ref 5–25)
CALCIUM SERPL-MCNC: 8.1 MG/DL (ref 8.3–10.1)
CHLORIDE SERPL-SCNC: 104 MMOL/L (ref 100–108)
CO2 SERPL-SCNC: 26 MMOL/L (ref 21–32)
CREAT SERPL-MCNC: 0.8 MG/DL (ref 0.6–1.3)
GFR SERPL CREATININE-BSD FRML MDRD: 86 ML/MIN/1.73SQ M
GLUCOSE SERPL-MCNC: 98 MG/DL (ref 65–140)
POTASSIUM SERPL-SCNC: 3.7 MMOL/L (ref 3.5–5.3)
SODIUM SERPL-SCNC: 137 MMOL/L (ref 136–145)

## 2020-02-22 PROCEDURE — 97116 GAIT TRAINING THERAPY: CPT | Performed by: PHYSICAL THERAPIST

## 2020-02-22 PROCEDURE — 97110 THERAPEUTIC EXERCISES: CPT | Performed by: PHYSICAL THERAPIST

## 2020-02-22 PROCEDURE — 80048 BASIC METABOLIC PNL TOTAL CA: CPT | Performed by: PHYSICIAN ASSISTANT

## 2020-02-22 RX ADMIN — DILTIAZEM HYDROCHLORIDE 180 MG: 180 CAPSULE, COATED, EXTENDED RELEASE ORAL at 09:10

## 2020-02-22 RX ADMIN — APIXABAN 2.5 MG: 2.5 TABLET, FILM COATED ORAL at 09:10

## 2020-02-22 RX ADMIN — SENNOSIDES 8.6 MG: 8.6 TABLET, FILM COATED ORAL at 09:10

## 2020-02-22 RX ADMIN — PANTOPRAZOLE SODIUM 40 MG: 40 TABLET, DELAYED RELEASE ORAL at 05:21

## 2020-02-22 RX ADMIN — CELECOXIB 200 MG: 200 CAPSULE ORAL at 09:10

## 2020-02-22 RX ADMIN — FERROUS SULFATE TAB 325 MG (65 MG ELEMENTAL FE) 325 MG: 325 (65 FE) TAB at 09:10

## 2020-02-22 RX ADMIN — OXYCODONE HYDROCHLORIDE AND ACETAMINOPHEN 250 MG: 500 TABLET ORAL at 09:10

## 2020-02-22 NOTE — PLAN OF CARE
Problem: PHYSICAL THERAPY ADULT  Goal: Performs mobility at highest level of function for planned discharge setting  See evaluation for individualized goals  Description  Treatment/Interventions: Functional transfer training, LE strengthening/ROM, Elevations, Therapeutic exercise, Endurance training, Patient/family training, Equipment eval/education, Bed mobility, Gait training, Compensatory technique education, Spoke to nursing  Equipment Recommended: Walker(has)       See flowsheet documentation for full assessment, interventions and recommendations  Outcome: Adequate for Discharge  Note:   Prognosis: Good  Problem List: Decreased strength, Decreased endurance, Decreased range of motion, Impaired balance, Decreased mobility, Decreased safety awareness, Decreased skin integrity, Orthopedic restrictions, Pain  Assessment: pt continues to work on gait stability , sequencing and ther ex  pt continues to need assist for lle ther x, pt has limited hip flexion rom at approx 70*  pt continues to have local swelling but no indicatoin of infection  pt performed stairs with close supervision  pt able to recall sequencing  problem solved home situation with stairs and pt tolerated well  pt demonstrated improved gait sequencing and did not kick l side of rw   pt will be able to return home, and has met goals for discharge  Barriers to Discharge: Inaccessible home environment  Barriers to Discharge Comments: full flight of stairs  Recommendation: Home with family support, Home PT     PT - OK to Discharge: Yes    See flowsheet documentation for full assessment

## 2020-02-22 NOTE — NURSING NOTE
IV removed intact  Discharge instructions reviewed with patient and spouse  Discharged to home with spouse in stable condition

## 2020-02-22 NOTE — PLAN OF CARE
Problem: PAIN - ADULT  Goal: Verbalizes/displays adequate comfort level or baseline comfort level  Description  Interventions:  - Encourage patient to monitor pain and request assistance  - Assess pain using appropriate pain scale  - Administer analgesics based on type and severity of pain and evaluate response  - Implement non-pharmacological measures as appropriate and evaluate response  - Consider cultural and social influences on pain and pain management  - Notify physician/advanced practitioner if interventions unsuccessful or patient reports new pain  Outcome: Progressing     Problem: INFECTION - ADULT  Goal: Absence or prevention of progression during hospitalization  Description  INTERVENTIONS:  - Assess and monitor for signs and symptoms of infection  - Monitor lab/diagnostic results  - Monitor all insertion sites, i e  indwelling lines, tubes, and drains  - Monitor endotracheal if appropriate and nasal secretions for changes in amount and color  - [place] appropriate cooling/warming therapies per order  - Administer medications as ordered  - Instruct and encourage patient and family to use good hand hygiene technique  - Identify and instruct in appropriate isolation precautions for identified infection/condition  Outcome: Progressing     Problem: SAFETY ADULT  Goal: Patient will remain free of falls  Description  INTERVENTIONS:  - Assess patient frequently for physical needs  -  Identify cognitive and physical deficits and behaviors that affect risk of falls    -  [place] fall precautions as indicated by assessment   - Educate patient/family on patient safety including physical limitations  - Instruct patient to call for assistance with activity based on assessment  - Modify environment to reduce risk of injury  - Consider OT/PT consult to assist with strengthening/mobility  Outcome: Progressing  Goal: Maintain or return to baseline ADL function  Description  INTERVENTIONS:  -  Assess patient's ability to carry out ADLs; assess patient's baseline for ADL function and identify physical deficits which impact ability to perform ADLs (bathing, care of mouth/teeth, toileting, grooming, dressing, etc )  - Assess/evaluate cause of self-care deficits   - Assess range of motion  - Assess patient's mobility; develop plan if impaired  - Assess patient's need for assistive devices and provide as appropriate  - Encourage maximum independence but intervene and supervise when necessary  - Involve family in performance of ADLs  - Assess for home care needs following discharge   - Consider OT consult to assist with ADL evaluation and planning for discharge  - Provide patient education as appropriate  Outcome: Progressing  Goal: Maintain or return mobility status to optimal level  Description  INTERVENTIONS:  - Assess patient's baseline mobility status (ambulation, transfers, stairs, etc )    - Identify cognitive and physical deficits and behaviors that affect mobility  - Identify mobility aids required to assist with transfers and/or ambulation (gait belt, sit-to-stand, lift, walker, cane, etc )  - [place] fall precautions as indicated by assessment  - Record patient progress and toleration of activity level on Mobility SBAR; progress patient to next Phase/Stage  - Instruct patient to call for assistance with activity based on assessment  - Consider rehabilitation consult to assist with strengthening/weightbearing, etc   Outcome: Progressing     Problem: DISCHARGE PLANNING  Goal: Discharge to home or other facility with appropriate resources  Description  INTERVENTIONS:  - Identify barriers to discharge w/patient and caregiver  - Arrange for needed discharge resources and transportation as appropriate  - Identify discharge learning needs (meds, wound care, etc )  - Arrange for interpretive services to assist at discharge as needed  - Refer to Case Management Department for coordinating discharge planning if the patient needs post-hospital services based on physician/advanced practitioner order or complex needs related to functional status, cognitive ability, or social support system  Outcome: Progressing     Problem: Knowledge Deficit  Goal: Patient/family/caregiver demonstrates understanding of disease process, treatment plan, medications, and discharge instructions  Description  Complete learning assessment and assess knowledge base  Interventions:  - Provide teaching at level of understanding  - Provide teaching via preferred learning methods  Outcome: Progressing     Problem: Potential for Falls  Goal: Patient will remain free of falls  Description  INTERVENTIONS:  - Assess patient frequently for physical needs  -  Identify cognitive and physical deficits and behaviors that affect risk of falls    -  [place] fall precautions as indicated by assessment   - Educate patient/family on patient safety including physical limitations  - Instruct patient to call for assistance with activity based on assessment  - Modify environment to reduce risk of injury  - Consider OT/PT consult to assist with strengthening/mobility  Outcome: Progressing

## 2020-02-22 NOTE — PROGRESS NOTES
Orthopedic Total Hip Progress Note      Subjective     Status post-Left total hip arthroplasty  Systemic or Specific Complaints: No Complaints    Objective     Vital signs in last 24 hours:  Temp:  [96 °F (35 6 °C)-97 2 °F (36 2 °C)] 97 2 °F (36 2 °C)  HR:  [] 108  Resp:  [16-20] 20  BP: ()/(59-77) 107/59    Neurovascular: Capillary refill: Normal  Dorsiflexion: 5/5  Plantarflexion:  5/5   Wound: Dressing:  clean/dry/intact   DVT Exam: No evidence of DVT seen on physical exam   Negative Tk's sign  No cords or calf tenderness  No significant calf/ankle edema  Data Review:  CBC:   Lab Results   Component Value Date    WBC 8 41 01/27/2020    RBC 4 22 01/27/2020    HGB 9 7 (L) 02/21/2020    HCT 30 9 (L) 02/21/2020     01/27/2020       Assessment/Plan     Status post-Left total hip arthroplasty: Doing well postoperatively      Discharge today, Return to Clinic: as scheduled      Activity: up with assistance      Cata Aj    Date: 2/22/2020  Time: 8:11 AM

## 2020-02-22 NOTE — INCIDENTAL FINDINGS
Please monitor for left knee swelling if there is any worsening then contact Dr Jimenez Floyd for possible arthrocentesis

## 2020-02-22 NOTE — PHYSICAL THERAPY NOTE
Physical Therapy Progress Note     02/22/20 0905   Pain Assessment   Pain Assessment 0-10   Pain Score 3   Pain Type Surgical pain   Pain Location Hip   Pain Orientation Left   Hospital Pain Intervention(s) Cold applied;Repositioned; Ambulation/increased activity; Emotional support   Response to Interventions tolerated well    Precautions   Total Hip Replacement Flexion   Restrictions/Precautions   LLE Weight Bearing Per Order WBAT   Other Precautions WBS;THR;Fall Risk   General   Chart Reviewed Yes   Response to Previous Treatment Patient with no complaints from previous session  Family/Caregiver Present Yes   Cognition   Overall Cognitive Status WFL   Orientation Level Oriented X4   Subjective   Subjective pt reports he is ready to go home  Bed Mobility   Supine to Sit 5  Supervision   Transfers   Sit to Stand 5  Supervision   Stand to Sit 5  Supervision   Toilet transfer 5  Supervision   Ambulation/Elevation   Gait pattern Excessively slow; Short stride; Wide JUAN   Gait Assistance 5  Supervision   Assistive Device Rolling walker   Distance 200' x2   Stair Management Assistance 5  Supervision   Additional items Verbal cues; Increased time required   Stair Management Technique Two rails; Step to pattern; Foreward   Number of Stairs 5   Balance   Static Sitting Good   Dynamic Sitting Fair   Static Standing Fair -   Dynamic Standing Fair -   Ambulatory Fair -   Endurance Deficit   Endurance Deficit No   Activity Tolerance   Activity Tolerance Patient tolerated treatment well   Nurse Made Aware yes   Exercises   THR Supine;15 reps;AAROM; Left   Assessment   Prognosis Good   Problem List Decreased strength;Decreased endurance;Decreased range of motion; Impaired balance;Decreased mobility; Decreased safety awareness;Decreased skin integrity;Orthopedic restrictions;Pain   Assessment pt continues to work on gait stability , sequencing and ther ex  pt continues to need assist for lle ther x, pt has limited hip flexion rom at approx 70*  pt continues to have local swelling but no indicatoin of infection  pt performed stairs with close supervision  pt able to recall sequencing  problem solved home situation with stairs and pt tolerated well  pt demonstrated improved gait sequencing and did not kick l side of rw   pt will be able to return home, and has met goals for discharge  Barriers to Discharge Inaccessible home environment   Goals   Patient Goals go home today   STG Expiration Date 02/23/20   PT Treatment Day 5   Plan   Treatment/Interventions LE strengthening/ROM; Elevations; Therapeutic exercise; Endurance training;Patient/family training;Equipment eval/education;Gait training;Spoke to nursing   Progress Improving as expected   PT Frequency 7x/wk; Twice a day   Recommendation   Recommendation Home with family support;Home PT   Equipment Recommended Walker   PT - OK to Discharge Yes   Additional Comments home     Sunita Farooq PT

## 2020-02-22 NOTE — PLAN OF CARE
Problem: PHYSICAL THERAPY ADULT  Goal: Performs mobility at highest level of function for planned discharge setting  See evaluation for individualized goals  Description  Treatment/Interventions: Functional transfer training, LE strengthening/ROM, Elevations, Therapeutic exercise, Endurance training, Patient/family training, Equipment eval/education, Bed mobility, Gait training, Compensatory technique education, Spoke to nursing  Equipment Recommended: Walker(has)       See flowsheet documentation for full assessment, interventions and recommendations  2/22/2020 1149 by David Scanlon PT  Outcome: Adequate for Discharge  Note:   Prognosis: Good  Problem List: Decreased strength, Decreased endurance, Decreased range of motion, Impaired balance, Decreased mobility, Decreased safety awareness, Decreased skin integrity, Orthopedic restrictions, Pain  Assessment: pt continues to work on gait stability , sequencing and ther ex  pt continues to need assist for lle ther x, pt has limited hip flexion rom at approx 70*  pt continues to have local swelling but no indicatoin of infection  pt performed stairs with close supervision  pt able to recall sequencing  problem solved home situation with stairs and pt tolerated well  pt demonstrated improved gait sequencing and did not kick l side of rw   pt will be able to return home, and has met goals for discharge  Barriers to Discharge: Inaccessible home environment  Barriers to Discharge Comments: full flight of stairs  Recommendation: Home with family support, Home PT     PT - OK to Discharge: Yes    See flowsheet documentation for full assessment       2/22/2020 1046 by David Scanlon PT  Outcome: Adequate for Discharge  Note:   Prognosis: Good  Problem List: Decreased strength, Decreased endurance, Decreased range of motion, Impaired balance, Decreased mobility, Decreased safety awareness, Decreased skin integrity, Orthopedic restrictions, Pain  Assessment: pt continues to work on gait stability , sequencing and ther ex  pt continues to need assist for lle ther x, pt has limited hip flexion rom at approx 70*  pt continues to have local swelling but no indicatoin of infection  pt performed stairs with close supervision  pt able to recall sequencing  problem solved home situation with stairs and pt tolerated well  pt demonstrated improved gait sequencing and did not kick l side of rw   pt will be able to return home, and has met goals for discharge  Barriers to Discharge: Inaccessible home environment  Barriers to Discharge Comments: full flight of stairs  Recommendation: Home with family support, Home PT     PT - OK to Discharge: Yes    See flowsheet documentation for full assessment

## 2020-02-22 NOTE — PROGRESS NOTES
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  Ambulatory dysfunction  Prostate cancer/ s/p radiation therapy 2017 for prostate cancer-follows Dr Colin Cohen  BPH  Hypercholesterolemia    Recommendation:    Patient has been cleared by physical therapy and Orthopedic team and is awaiting to be discharged  He will continue postoperative care as recommended by primary orthopedic team   Reviewed with patient and his wife and discussed with Dr Gregoria Bryant regarding his left knee fusion  Considering that his effusion is not painful and his range of motion is not limited, this seems to be more reactive following therapy and his hip surgery  Dr Gregoria Bryant indicates that patient and call the office if there is any worsening or any new changes  Patient has been advised to use ice packs locally  Risk of infection by arthrocentesis was discussed and patient is not keen  He wants to wait and will inform orthopedic team and wants to go home today  He will continue ferrous sulfate ascorbic acid and follow with his cardiologist regarding his atrial fibrillation, anticoagulation with Eliquis and use of Cardizem  Subjective:     Patient seen and examined today  EMR and overnight events reviewed  Patient eating his breakfast not in any apparent distress  Wife is in the room  They are waiting to be discharged  Denies any complaints to me  Denies any bleeding discharge or drainage      Review of Systems     Patient has no fever chills or rigors denies any chest pain shortness of breath cough  Denies any pain at the operated hip site or mildly swollen left knee  Denies any leg cramps  Denies any breathing difficulty  No skin rashes no urinary symptoms  No dizziness no lightheadedness  All other systems reviewed and are negative  OBJECTIVE:     Vitals:     Temp:  [97 2 °F (36 2 °C)] 97 2 °F (36 2 °C)  HR:  [] 108  Resp:  [16-20] 20  BP: (107-114)/(59-77) 107/59  SpO2:  [95 %-100 %] 100 %  Temp (24hrs), Av 2 °F (36 2 °C), Min:97 2 °F (36 2 °C), Max:97 2 °F (36 2 °C)  Current: Temperature: (!) 97 2 °F (36 2 °C)    Intake/Output Summary (Last 24 hours) at 2020 1202  Last data filed at 2020 2328  Gross per 24 hour   Intake    Output 1200 ml   Net -1200 ml     Body mass index is 26 64 kg/m²  Physical Exam    Patient is awake and alert he is cooperative not in any apparent distress  Patient's wife is in the room with him  Pallor is 1+  HEENT normocephalic atraumatic  Tongue protrudes in midline without any thrush  JVP cyanosis clubbing pedal edema negative  Bilateral lungs are clear without any rales or crackles  Irregularly irregular S1-S2 without any fast rate, 2/6 ejection systolic murmur left ventricular apex without any change  Abdomen is soft and nontender there is no hepatosplenomegaly clinically common bowel sounds are present  Higher mental functions are intact, his good hand grasp and plantar flexion  Denies any sensory diminution to touch in his upper and lower extremities  Operated left hip area has clean dressing/no discharge no drainage no bleeding or redness  Left knee joint is swollen, there is mild effusion without any crepitus or tenderness  Knee joint is not tender it is not warm to touch  Range of motion at the knee joint is unremarkable, hip joint though is limited from his surgery    Bilateral Homans sign is negative and distal pulses are intact in posterior tibialis dorsalis pedis  Skin is dry warm without any rashes or purpura        Labs, Imaging, & Other studies:    All pertinent labs and imaging studies were personally reviewed  Results from last 7 days   Lab Units 02/21/20  0423 02/20/20  0437   HEMOGLOBIN g/dL 9 7* 10 2*     Results from last 7 days   Lab Units 02/22/20  0421   POTASSIUM mmol/L 3 7   CHLORIDE mmol/L 104   CO2 mmol/L 26   BUN mg/dL 9   CREATININE mg/dL 0 80   EGFR ml/min/1 73sq m 86   CALCIUM mg/dL 8 1*           Current Meds:  Current Facility-Administered Medications   Medication Dose Route Frequency Provider Last Rate Last Dose    acetaminophen (TYLENOL) tablet 650 mg  650 mg Oral Q6H PRN Molly Flores PA-C        apixaban (ELIQUIS) tablet 2 5 mg  2 5 mg Oral BID Molly Flores PA-C   2 5 mg at 02/22/20 2331    ascorbic acid (VITAMIN C) tablet 250 mg  250 mg Oral Daily Елена Ricks MD   250 mg at 02/22/20 6222    atorvastatin (LIPITOR) tablet 20 mg  20 mg Oral Once per day on Mon Wed Fri Kam Beverly PA-C   20 mg at 02/21/20 1711    calcium carbonate (TUMS) chewable tablet 1,000 mg  1,000 mg Oral Daily PRN Molly Flores PA-C        celecoxib (CeleBREX) capsule 200 mg  200 mg Oral Daily Molly Flores PA-C   200 mg at 02/22/20 2756    diltiazem (CARDIZEM CD) 24 hr capsule 180 mg  180 mg Oral Daily Molly Flores PA-C   180 mg at 02/22/20 2147    ferrous sulfate tablet 325 mg  325 mg Oral BID With Meals Елена Ricks MD   325 mg at 02/22/20 2642    lactated ringers infusion  125 mL/hr Intravenous Continuous Molly Flores PA-C   Stopped at 02/20/20 2529    methocarbamol (ROBAXIN) tablet 500 mg  500 mg Oral Q6H PRN Molly Flores PA-C   500 mg at 02/21/20 1323    ondansetron (ZOFRAN) injection 4 mg  4 mg Intravenous Q6H PRN Molly Flores PA-C        oxyCODONE (ROXICODONE) immediate release tablet 10 mg  10 mg Oral Q4H PRN Molly Flores PA-C   10 mg at 02/21/20 9793    oxyCODONE (ROXICODONE) IR tablet 5 mg  5 mg Oral Q4H PRN Molly Flores PA-C   5 mg at 02/21/20 3799    pantoprazole (PROTONIX) EC tablet 40 mg  40 mg Oral Daily Before Breakfast Stevphen Del, PA-C   40 mg at 02/22/20 3562    senna (SENOKOT) tablet 8 6 mg  1 tablet Oral Daily Stevphen Del, PA-C   8 6 mg at 02/22/20 6463    simethicone (MYLICON) chewable tablet 80 mg  80 mg Oral 4x Daily PRN Stevphen Del, PA-C        sodium chloride 0 9 % infusion  125 mL/hr Intravenous Continuous Kam Hill DO   Stopped at 02/19/20 1317    tamsulosin (FLOMAX) capsule 0 4 mg  0 4 mg Oral Daily With Carlito Townsend PA-C   0 4 mg at 02/21/20 1710    traMADol (ULTRAM) tablet 50 mg  50 mg Oral Q6H PRN Stevphen Del, PA-C         Home Meds:  Medications Prior to Admission   Medication    apixaban (ELIQUIS) 2 5 mg    atorvastatin (LIPITOR) 20 mg tablet    cholecalciferol (VITAMIN D3) 1,000 units tablet    diltiazem (CARDIZEM CD) 180 mg 24 hr capsule    Multiple Vitamins-Minerals (MULTIVITAMIN WITH MINERALS) tablet    tamsulosin (FLOMAX) 0 4 mg       Continuous Infusions:    lactated ringers 125 mL/hr Last Rate: Stopped (02/20/20 0718)   sodium chloride 125 mL/hr Last Rate: Stopped (02/19/20 1317)       Invasive Devices     Peripheral Intravenous Line            Peripheral IV 02/19/20 Left Hand 3 days                VTE Pharmacologic Prophylaxis: Eliquis as per Primary Ortho Team   VTE Mechanical Prophylaxis: sequential compression device    Portions of the record may have been created with voice recognition software  Occasional wrong word or "sound a like" substitutions may have occurred due to the inherent limitations of voice recognition software  Read the chart carefully and recognize, using context, where substitutions have occurred

## 2020-02-24 NOTE — DISCHARGE SUMMARY
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]    Discharge Diagnoses  Left hip osteoarthritis     [facility] was admitted to [facility] on 2/19/2020, with diagnosis of osteoarthritis in his Left hip  On the day of admission, he was brought to surgery, successfully underwent a Left hip replacement  He tolerated the procedure well  Following surgery, he was taken to the recovery room, at which time, there was a consult placed for Dr Chino Rodriguez for the patient's medical management  After a short stay in the recovery room, he was transferred to the orthopedic floor at which time, he was resumed on his routine home medications per the hospitalist group  On postop day #1, he was able to start some physical therapy and was able to tolerate it well  At this time, he was in stable condition, showing no sign of deep vein thrombosis or pulmonary embolism  Incision was healing well at the time and showed no signs of infection  DISCHARGE DIAGNOSIS: Unilateral primary osteoarthritis, 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]  He is now status post Left total hip replacement, which he underwent during the hospital stay  Medications  See after visit summary for reconciled discharge medications provided to patient and family      Allergies  No Known Allergies    Outpatient Follow-Up  Future Appointments   Date Time Provider Kristina Will   10/19/2020  7:30 AM MD KEE Rahman [facility]       Discharge Disposition  home    Operative Procedures Performed  Procedure(s):  ARTHROPLASTY HIP TOTAL ANTERIOR        Mervat Adames PA-C    DISCHARGE SUMMARY

## 2020-04-13 ENCOUNTER — TELEPHONE (OUTPATIENT)
Dept: UROLOGY | Facility: CLINIC | Age: 80
End: 2020-04-13

## 2020-05-20 DIAGNOSIS — R35.0 URINARY FREQUENCY: ICD-10-CM

## 2020-05-20 DIAGNOSIS — Z85.46 HISTORY OF PROSTATE CANCER: ICD-10-CM

## 2020-05-20 RX ORDER — TAMSULOSIN HYDROCHLORIDE 0.4 MG/1
CAPSULE ORAL
Qty: 90 CAPSULE | Refills: 3 | Status: SHIPPED | OUTPATIENT
Start: 2020-05-20 | End: 2021-07-26 | Stop reason: SDUPTHER

## 2020-10-13 ENCOUNTER — LAB (OUTPATIENT)
Dept: LAB | Facility: MEDICAL CENTER | Age: 80
End: 2020-10-13
Attending: UROLOGY
Payer: MEDICARE

## 2020-10-13 DIAGNOSIS — Z85.46 HISTORY OF PROSTATE CANCER: ICD-10-CM

## 2020-10-13 LAB — PSA SERPL-MCNC: 0.3 NG/ML (ref 0–4)

## 2020-10-13 PROCEDURE — 84153 ASSAY OF PSA TOTAL: CPT

## 2020-10-19 ENCOUNTER — OFFICE VISIT (OUTPATIENT)
Dept: UROLOGY | Facility: MEDICAL CENTER | Age: 80
End: 2020-10-19
Payer: MEDICARE

## 2020-10-19 VITALS
BODY MASS INDEX: 25.33 KG/M2 | WEIGHT: 187 LBS | DIASTOLIC BLOOD PRESSURE: 62 MMHG | SYSTOLIC BLOOD PRESSURE: 102 MMHG | TEMPERATURE: 97.6 F | HEIGHT: 72 IN

## 2020-10-19 DIAGNOSIS — N40.1 BPH WITH URINARY OBSTRUCTION: ICD-10-CM

## 2020-10-19 DIAGNOSIS — N13.8 BPH WITH URINARY OBSTRUCTION: ICD-10-CM

## 2020-10-19 DIAGNOSIS — Z85.46 HISTORY OF PROSTATE CANCER: Primary | ICD-10-CM

## 2020-10-19 PROCEDURE — 99214 OFFICE O/P EST MOD 30 MIN: CPT | Performed by: UROLOGY

## 2020-10-19 RX ORDER — DIPHENOXYLATE HYDROCHLORIDE AND ATROPINE SULFATE 2.5; .025 MG/1; MG/1
1 TABLET ORAL DAILY
COMMUNITY

## 2021-01-20 DIAGNOSIS — Z23 ENCOUNTER FOR IMMUNIZATION: ICD-10-CM

## 2021-01-24 ENCOUNTER — IMMUNIZATIONS (OUTPATIENT)
Dept: FAMILY MEDICINE CLINIC | Facility: HOSPITAL | Age: 81
End: 2021-01-24

## 2021-01-24 DIAGNOSIS — Z23 ENCOUNTER FOR IMMUNIZATION: Primary | ICD-10-CM

## 2021-01-24 PROCEDURE — 91301 SARS-COV-2 / COVID-19 MRNA VACCINE (MODERNA) 100 MCG: CPT

## 2021-01-24 PROCEDURE — 0011A SARS-COV-2 / COVID-19 MRNA VACCINE (MODERNA) 100 MCG: CPT

## 2021-02-20 ENCOUNTER — IMMUNIZATIONS (OUTPATIENT)
Dept: FAMILY MEDICINE CLINIC | Facility: HOSPITAL | Age: 81
End: 2021-02-20

## 2021-02-20 DIAGNOSIS — Z23 ENCOUNTER FOR IMMUNIZATION: Primary | ICD-10-CM

## 2021-02-20 PROCEDURE — 91301 SARS-COV-2 / COVID-19 MRNA VACCINE (MODERNA) 100 MCG: CPT

## 2021-02-20 PROCEDURE — 0012A SARS-COV-2 / COVID-19 MRNA VACCINE (MODERNA) 100 MCG: CPT

## 2021-07-26 DIAGNOSIS — R35.0 URINARY FREQUENCY: ICD-10-CM

## 2021-07-26 DIAGNOSIS — Z85.46 HISTORY OF PROSTATE CANCER: ICD-10-CM

## 2021-07-26 RX ORDER — TAMSULOSIN HYDROCHLORIDE 0.4 MG/1
CAPSULE ORAL
Qty: 90 CAPSULE | Refills: 0 | Status: SHIPPED | OUTPATIENT
Start: 2021-07-26 | End: 2021-10-08

## 2021-10-08 DIAGNOSIS — Z85.46 HISTORY OF PROSTATE CANCER: ICD-10-CM

## 2021-10-08 DIAGNOSIS — R35.0 URINARY FREQUENCY: ICD-10-CM

## 2021-10-08 RX ORDER — TAMSULOSIN HYDROCHLORIDE 0.4 MG/1
CAPSULE ORAL
Qty: 90 CAPSULE | Refills: 3 | Status: SHIPPED | OUTPATIENT
Start: 2021-10-08

## 2021-10-18 ENCOUNTER — APPOINTMENT (OUTPATIENT)
Dept: LAB | Facility: MEDICAL CENTER | Age: 81
End: 2021-10-18
Attending: UROLOGY
Payer: MEDICARE

## 2021-10-18 DIAGNOSIS — Z85.46 HISTORY OF PROSTATE CANCER: ICD-10-CM

## 2021-10-18 LAB — PSA SERPL-MCNC: 0.3 NG/ML (ref 0–4)

## 2021-10-18 PROCEDURE — 84153 ASSAY OF PSA TOTAL: CPT

## 2022-02-24 NOTE — PHYSICAL THERAPY NOTE
PT EVALUATION    Pt  Name: Suhail Bruner  Pt  Age: 69 y o  There is no problem list on file for this patient        Past Medical History:   Diagnosis Date    Arthritis     Atrial fibrillation ([address] )     Cancer [facility])     prostate    Dental crowns present     History of pleurisy 1974    History of pneumonia     several years ago    History of prostate cancer     History of radiation therapy     final treatment May 2017    [place] (hard of hearing)     bilat w hearing aids    Hyperlipidemia     Left thigh pain     Pneumonia     Right hip pain     Unsteady gait     Wears glasses        Past Surgical History:   Procedure Laterality Date    CARPAL TUNNEL RELEASE Bilateral     COLONOSCOPY      FOOT SURGERY Right     HEMORRHOID SURGERY        11/29/17 1215   Note Type   Note type Eval only   Pain Assessment   Pain Score No Pain   Home Living   Type of [address] [place] [address] Ave Two level;Stairs to enter with rails  (13steps; 1+1STE)   Home Equipment (none)   Prior Function   Level of [place] Independent with ADLs and functional mobility  (w/o AD)   Lives With Spouse   Receives Help From Family   ADL Assistance Independent   Falls in the last 6 months 0   Restrictions/Precautions   Weight Bearing Precautions Per Order Yes   RLE Weight Bearing Per Order WBAT   Other Precautions Fall Risk;Pain;Multiple lines   General   Family/Caregiver Present Yes   Cognition   Overall Cognitive Status WFL   Arousal/Participation Alert   Orientation Level Oriented X4   Following Commands Follows all commands and directions without difficulty   RUE Assessment   RUE Assessment WFL   RUE Strength   RUE Overall Strength Within Functional Limits - strength 5/5   LUE Assessment   LUE Assessment WFL   LUE Strength   LUE Overall Strength Within Functional Limits - strength 5/5   RLE Assessment   RLE Assessment WFL   Strength RLE   RLE Overall Strength 4-/5   R Hip Flexion 3+/5   LLE Assessment   LLE Assessment [facility] Strength LLE   LLE Overall Strength 5/5   Coordination   Movements are Fluid and Coordinated 1   Sensation WFL   Bed Mobility   Supine to Sit 4  Minimal assistance   Additional items Assist x 1;HOB elevated; Bedrails; Increased time required;Verbal cues;LE management   Additional Comments cues for techniques   Transfers   Sit to Stand 4  Minimal assistance   Additional items Assist x 1; Increased time required;Verbal cues   Stand to Sit 4  Minimal assistance   Additional items Assist x 1; Armrests; Increased time required;Verbal cues   Additional Comments cues for hand placement & safety techniques   Ambulation/Elevation   Gait pattern Decreased foot clearance;Decreased R stance; Short stride; Excessively slow   Gait Assistance 4  Minimal assist   Additional items Assist x 1;Verbal cues; Tactile cues   Assistive Device Rolling walker   Distance 40'x1   Balance   Static Sitting Good   Static Standing Fair -   Ambulatory Poor +   Activity Tolerance   Activity Tolerance Patient tolerated treatment well   Nurse Made Aware farnaz   Assessment   Prognosis Good   Problem List Decreased strength;Decreased range of motion;Decreased endurance; Impaired balance;Decreased mobility;Pain   Assessment Pt  77 y o male s/p R THR (anterior) 2* to severe DJD  Pt referred to PT for mobility assessment & D/C planning  PTA, pt reports being I w/o AD  On eval, pt demonstrate dec mobility, balance, endurance & amb 2* to RLE weakness  Pt require minAx1 for most functional mobility + cues for techniques  Gait deviations as above, slow & antalgic but no gross LOB noted  No dizziness reported t/o session  /67 pre-session; /77 at end of session  Will continue skilled PT to improve function & safety  Pt plans to return home when medically cleared  At this time, will anticipate good progress in PT for safe D/C to home  Will recommend HHPT, RW, BSC & family support at D/C  Pt will need to achieve PT goals prior to D/C for safe return to home  CM to follow  Pt tolerated OOB in chair at end of session w/o issues  Call bell in reach  Nsg staff to continue to mobilized pt (OOB in chair for all meals & ambulate in room/unit) as tolerated to prevent further decline in function  Nsg notified  Barriers to Discharge None   Goals   Patient Goals go home   STG Expiration Date 12/04/17   Short Term Goal #1 Goals to be met in days: 1) inc strength & balance by 1/2 grade; 2) inc functional mobility to S; 3) inc amb w/ RW approx  >80' w/ S; 4) inc barthel score to 65; 5) negotiate stairs w/ S; 6) pt/caregiver ed   Treatment Day 0   Plan   Treatment/Interventions Functional transfer training;LE strengthening/ROM; Elevations; Therapeutic exercise; Endurance training;Patient/family training;Bed mobility;Gait training;Spoke to nursing;Family   PT Frequency Twice a day;7x/wk; Weekend   Recommendation   Recommendation Home PT; Home with family support   Equipment Recommended Tesfaye Mu; Other (Comment)  (RW & BSC)   PT - OK to Discharge No   Additional Comments pt to achieve PT goals prior to D/C   Barthel Index   Feeding 10   Bathing 0   Grooming Score 5   Dressing Score 5   Bladder Score 0   Bowels Score 10   Toilet Use Score 5   Transfers (Bed/Chair) Score 10   Mobility (Level Surface) Score 0   Stairs Score 0   Barthel Index Score 45   Hx/personal factors: co-morbidities; fall risk; currently functioning below baseline; inaccessible home; lines; use of AD  Examination: RLE weakness, dec mobility, dec balance, dec endurance, dec amb, high risk for falls  Clinical: unpredictable (POD#0, pain mgt; fall risk)  Complexity: high    [place] Manners, PT cholethoasois with acute cholecystitis

## 2022-03-10 NOTE — TELEPHONE ENCOUNTER
Patient inquiring if he needs bloodwork before his appointment? Please give him a call back    Thank you 2

## 2022-11-08 ENCOUNTER — TELEPHONE (OUTPATIENT)
Dept: OTHER | Facility: OTHER | Age: 82
End: 2022-11-08

## 2022-11-08 DIAGNOSIS — C61 PROSTATE CANCER (HCC): Primary | ICD-10-CM

## 2022-11-08 NOTE — PROGRESS NOTES
PSA ordered as pt is schedule with Dr Nieves on 11/11/22   Placed call to pt and advised to have PSA done prior to scheduled visit

## 2022-11-08 NOTE — TELEPHONE ENCOUNTER
Pt is returning call from office in regards to his appointment on 11/11/2022 at 2:30 pm  Patient stated has PSA test done 3 weeks ago  Patient requesting a call back

## 2022-11-09 ENCOUNTER — APPOINTMENT (OUTPATIENT)
Dept: LAB | Facility: MEDICAL CENTER | Age: 82
End: 2022-11-09
Attending: UROLOGY

## 2022-11-09 DIAGNOSIS — C61 PROSTATE CANCER (HCC): ICD-10-CM

## 2022-11-09 LAB — PSA SERPL-MCNC: 0.3 NG/ML (ref 0–4)

## 2022-11-09 NOTE — TELEPHONE ENCOUNTER
I spoke with pt and he had his PSA done today and I let him know the results will be discussed at his appt on Friday  Pt understood

## 2022-11-11 ENCOUNTER — OFFICE VISIT (OUTPATIENT)
Dept: UROLOGY | Facility: MEDICAL CENTER | Age: 82
End: 2022-11-11

## 2022-11-11 VITALS
HEIGHT: 72 IN | SYSTOLIC BLOOD PRESSURE: 118 MMHG | DIASTOLIC BLOOD PRESSURE: 78 MMHG | BODY MASS INDEX: 25.87 KG/M2 | WEIGHT: 191 LBS | HEART RATE: 69 BPM | OXYGEN SATURATION: 98 %

## 2022-11-11 DIAGNOSIS — R35.0 URINARY FREQUENCY: ICD-10-CM

## 2022-11-11 DIAGNOSIS — Z85.46 HISTORY OF PROSTATE CANCER: ICD-10-CM

## 2022-11-11 DIAGNOSIS — N13.8 BPH WITH URINARY OBSTRUCTION: Primary | ICD-10-CM

## 2022-11-11 DIAGNOSIS — N40.1 BPH WITH URINARY OBSTRUCTION: Primary | ICD-10-CM

## 2022-11-11 RX ORDER — TAMSULOSIN HYDROCHLORIDE 0.4 MG/1
CAPSULE ORAL
Qty: 90 CAPSULE | Refills: 3 | Status: SHIPPED | OUTPATIENT
Start: 2022-11-11

## 2022-11-11 NOTE — PROGRESS NOTES
HISTORY:    1  Follow-up prostate cancer    S/p radiation therapy in April 2017 for prostate cancer        initial pathology:  Five cores out of 12, Conner's six and seven   Had hormone therapy the first 15 months   No longer any hot flashes      Moderate BPH, Good relief of his voiding symptoms with tamsulosin, good stream and control nocturia times 1-2  PSA 0 3 in November 2022  Has been 0 3 the past two years      ASSESSMENT / PLAN:    Doing well   PROSTATE CANCER WELL CONTROLLED    BPH WELL HANDLED ON TAMSULOSIN WHICH WE WILL CONTINUE    PSA consistently low the past five years     Needs PSA once per year    WE WILL ASK PCP TO GET YEARLY PSA AND REFILL TAMSULOSIN FOR PATIENT  FOLLOW-UP HERE IF ANYTHING NEW COMES UP  The following portions of the patient's history were reviewed and updated as appropriate: allergies, current medications, past family history, past medical history, past social history, past surgical history and problem list     Review of Systems   All other systems reviewed and are negative  Objective:     Physical Exam  Constitutional:       General: He is not in acute distress  Appearance: He is well-developed  He is not diaphoretic  HENT:      Head: Normocephalic and atraumatic  Eyes:      General: No scleral icterus  Pulmonary:      Effort: Pulmonary effort is normal    Genitourinary:     Comments: Penis testes normal     Prostate small flat benign  Skin:     Coloration: Skin is not pale  Neurological:      Mental Status: He is alert and oriented to person, place, and time  Psychiatric:         Behavior: Behavior normal          Thought Content:  Thought content normal          Judgment: Judgment normal            0   Lab Value Date/Time    PSA 0 3 11/09/2022 0840    PSA 0 3 10/18/2021 0942    PSA 0 3 10/13/2020 1326   ]  BUN   Date Value Ref Range Status   02/22/2020 9 5 - 25 mg/dL Final     Creatinine   Date Value Ref Range Status   02/22/2020 0 80 0 60 - 1 30 mg/dL Final     Comment:     Standardized to IDMS reference method     No components found for: CBC      Patient Active Problem List   Diagnosis   • Primary localized osteoarthritis of left hip   • Rectal bleeding   • History of prostate cancer   • Urinary frequency   • Primary osteoarthritis of left hip   • BPH with urinary obstruction        Diagnoses and all orders for this visit:    BPH with urinary obstruction    History of prostate cancer  -     tamsulosin (FLOMAX) 0 4 mg; TAKE 1 CAPSULE DAILY RIGHT AFTER SUPPER    Urinary frequency  -     tamsulosin (FLOMAX) 0 4 mg; TAKE 1 CAPSULE DAILY RIGHT AFTER SUPPER    Other orders  -     apixaban (ELIQUIS) 5 mg; 5 mg 2 (two) times a day           Patient ID: Ledy Hooks is a 81 y o  male        Current Outpatient Medications:   •  apixaban (ELIQUIS) 5 mg, 5 mg 2 (two) times a day, Disp: , Rfl:   •  atorvastatin (LIPITOR) 20 mg tablet, Take 20 mg by mouth 3 (three) times a week, Disp: , Rfl:   •  diltiazem (CARDIZEM CD) 180 mg 24 hr capsule, Take 1 capsule by mouth daily, Disp: 90 capsule, Rfl: 0  •  multivitamin (THERAGRAN) TABS, Take 1 tablet by mouth daily, Disp: , Rfl:   •  tamsulosin (FLOMAX) 0 4 mg, TAKE 1 CAPSULE DAILY RIGHT AFTER SUPPER, Disp: 90 capsule, Rfl: 3  •  apixaban (ELIQUIS) 2 5 mg, Take 1 tablet by mouth 2 (two) times a day (Patient not taking: Reported on 11/11/2022), Disp: 60 tablet, Rfl: 0  •  ascorbic acid (VITAMIN C) 250 MG tablet, Take 1 tablet (250 mg total) by mouth daily (Patient not taking: No sig reported), Disp: 30 tablet, Rfl: 0  •  ferrous sulfate 325 (65 Fe) mg tablet, Take 1 tablet (325 mg total) by mouth 2 (two) times a day with meals (Patient not taking: Reported on 10/19/2020), Disp: 60 tablet, Rfl: 0    Past Medical History:   Diagnosis Date   • Arthritis    • Atrial fibrillation (HCC)    • Cancer [facility])     prostate   • Dental crowns present    • Elevated prostate specific antigen (PSA)    • Frequency of micturition    • History of pleurisy 1974   • History of pneumonia     several years ago   • History of prostate cancer    • History of radiation therapy     final treatment May 2017   • History of rectal bleeding     s/p radiation   • [place] (hard of hearing)     bilat w hearing aids   • Hypercholesterolemia    • Hyperlipidemia    • Left thigh pain    • Nocturia    • Pneumonia    • Right hip pain    • Unspecified osteoarthritis, unspecified site    • Unsteady gait    • Wears glasses    • Wears hearing aid in both ears        Past Surgical History:   Procedure Laterality Date   • CARPAL TUNNEL RELEASE Bilateral    • COLONOSCOPY     • FOOT SURGERY Right    • HEMORRHOID SURGERY     • WI TOTAL HIP ARTHROPLASTY Right 11/29/2017    Procedure: ARTHROPLASTY HIP TOTAL ANTERIOR;  Surgeon: Mino Galaviz MD;  Location: [facility];  Service: Orthopedics   • WI TOTAL HIP ARTHROPLASTY Left 2/19/2020    Procedure: ARTHROPLASTY HIP TOTAL ANTERIOR;  Surgeon: Mino Galaviz MD;  Location: [facility];  Service: Orthopedics   • TONSILLECTOMY     • TRANSURETHRAL NEEDLE ABLATION OF THE PROSTATE         Social History Preventive measure

## 2023-10-18 DIAGNOSIS — R35.0 URINARY FREQUENCY: ICD-10-CM

## 2023-10-18 DIAGNOSIS — Z85.46 HISTORY OF PROSTATE CANCER: ICD-10-CM

## 2023-10-18 RX ORDER — TAMSULOSIN HYDROCHLORIDE 0.4 MG/1
CAPSULE ORAL
Qty: 90 CAPSULE | Refills: 0 | Status: SHIPPED | OUTPATIENT
Start: 2023-10-18

## 2024-01-10 DIAGNOSIS — R35.0 URINARY FREQUENCY: ICD-10-CM

## 2024-01-10 DIAGNOSIS — Z85.46 HISTORY OF PROSTATE CANCER: ICD-10-CM

## 2024-01-10 NOTE — TELEPHONE ENCOUNTER
Patient needs an appointment. Please contact the patient to schedule an appointment. Courtesy refill NOT provided.

## 2024-01-11 RX ORDER — TAMSULOSIN HYDROCHLORIDE 0.4 MG/1
CAPSULE ORAL
Qty: 90 CAPSULE | Refills: 1 | Status: SHIPPED | OUTPATIENT
Start: 2024-01-11

## 2024-11-27 NOTE — PLAN OF CARE
Problem: PHYSICAL THERAPY ADULT  Goal: Performs mobility at highest level of function for planned discharge setting  See evaluation for individualized goals  Treatment/Interventions: Functional transfer training, LE strengthening/ROM, Elevations, Therapeutic exercise, Endurance training, Patient/family training, Bed mobility, Gait training, Spoke to nursing, Family  Equipment Recommended: Steph Odonnell, Other (Comment) ( [facility])       See flowsheet documentation for full assessment, interventions and recommendations  Outcome: Progressing  Prognosis: Fair  Problem List: Decreased strength, Decreased range of motion, Impaired balance, Decreased endurance, Decreased mobility, Pain  Assessment: Pt  supine in bed upon my arrival  Performance of HEP supine in bed  Pt  able to complete all transfers practicing proper technique with no noted LOB  Requested to use br able to complete pericare  BP taken standing at 99/64  Pt  continues to remain asymptomatic with no reports  Progressed with increased amb  trial with use of RW and standbyA of therapist with cueing provided for improvement of LE sequencing  Upon return to room repositioned seated in bedside chair with ice applied at end of treatment session  Pt  will continue to progress with PT goals to ensure a safe d/c home with HHPT and family support as needed when medically stable for d/c    Barriers to Discharge: Inaccessible home environment     Recommendation: Home PT, Home with family support     PT - OK to Discharge: No (Continued progression of PT goals )    See flowsheet documentation for full assessment  no

## 2025-03-11 NOTE — CONSULTS
Consultation - Internal Medicine  Tay Chen 69 y o  male MRN: [id number]  Unit/Bed#: E2 -01 Encounter: [id number]      Impression :-    A very pleasant 64-year-old gentleman with status post right total hip replacement earlier today by Dr Ivana Barber  Advanced end-stage DJD right hip  Chronic atrial fibrillation (patient was off antiarrhythmic drugs on his own for last 5 days by mistake)  Hypercholesterolemia  Chronically anticoagulated with Factor Xa inhibitor-Eliquis  Prostate cancer  Previous history of left carpal tunnel surgery 2014     Recommendation: -    Patient seems to be recuperating very well at this stage following his surgery  Discussed patient's case with Dr Ivana Barber earlier  Patient apparently did not take his Cardizem for last 5 days on his own in an error thinking he was not supposed to take that with Eliquis  Patient should resume his Cardizem now and considering that he missed his medication for last 5 days he is at high risk of having a redness he should be monitored closely on a telemetry  I would recommend resuming patient's anticoagulation has been recommended by his outpatient cardiologist Dr Rayne Cormier  Without his antiarrhythmics and his Factor Xa inhibitor patient is high risk of thromboembolic stroke  At the same time patient is increase risk of bleeding being on a Factor Xa inhibitor in the operated site  Patient fully understand the risk and benefit at this stage  I have reviewed patients medications as initiated on post operative orders by the primary team  Recommend  monitoring closely for any hypoxemia / respiratory insufficieny related to narcotics and to reduce doses and frequency of narcotics if necessary  Resume patients home medications and I have reviewed them  Maintain Intravenous Fluids for next 24  hours, till patient able to reliably keep meals and meds in  Suggest  Zofran ODT 4 mg sublingually PRN for control of post operative nausea    Avoid Discharge Planning:     (KALEE) spoke with Moises [phone] with [facility] who informed the CM that the NIV is pending delivery today to the patient hospital room. KALEE stated he will follow up with CM once he knows a time the NIV will be delivered.     Electronically signed by GARTH Etienne on 3/11/2025 at 9:50 AM    dehydration and place patient on postoperative orthopedic hypotension protocol  Patient encouraged to  use Incentive spirometry q 15 minutes while awake to minimize risk of postoperative atelectasis and pneumonia  Patient verbalized to understand and fully comprehend  Continue Eliquis both for his underlying chronic anticoagulation for thromboembolic stroke prevention for his atrial fibrillation as well as for DVT and PE prophylaxis  Discontinue patient's Durán catheter within next 24-48 hours in order  to minimize risk of catheter associated UTI  Please check patient's hemoglobin and hematocrit within next 24 hours to ensure that there is no acute blood loss anemia which would need any blood transfusion  We will follow this pleasant patient with your service closely and recommend necessary changes based on  further hospital course and diagnostics  Thank you, Dr Jennifer Lea , for your kind consultation  HISTORY OF PRESENT ILLNESS:    Reason for Consult:  Post operative management for patient was underlying history of atrial fibrillation and has undergone elective right hip replacement earlier today by Dr Jennifer Lea  HPI: Nathalia George is a 69 y o  male, retired  who has suffered with chronic pain in his right hip for a long period of time  Particularly over last several months pain was increasing in intensity going down from his groin to the lateral and posterior aspect of the hip  Pain was exacerbated with simple activities like prolonged sitting and standing  He was unable to walk without exacerbating his pain symptoms  He was having hard time walking up and down the stairs  He was unable to take much nonsteroidal anti-inflammatory medications because he has been on Eliquis for atrial fibrillation  Patient tried physical therapy without much relief    He was followed by Dr Toy Riley his outpatient primary care physician who referred him to orthopedic team   He was seen by another physician Dr Jimenez who initially diagnosed him to have advanced right hip DJD  Patient was referred then to Dr Ling Velez  Patient also has suffered with chronic lower back pain without any blood or bowel incontinence  He occasionally gets leg cramps bilaterally  Patient has underlying history of prostate cancer and has been on Lupron therapy  Patient underwent the surgery earlier today without any complications and now his recuperating very well  Patient had discontinued his Eliquis 5 days prior to the surgery but in addition to that he also discontinued his Cardizem  For last 5 days he has not taken any antiarrhythmic therapy and fortunately has no symptoms of dizziness palpitations chest pain syncope gray out etc   He was evaluated by his outpatient cardiologist from the [address] [place] [address] on 11/15/2017  At home normally takes Eliquis 5 mg b i d  and Cardizem 300 mg daily which she has not taken for last 5 days  I discussed with Dr Ling Velez and patient will be resumed on these medications  Review of Systems   Constitutional:  No recent  appetite change, denies chills, diaphoresis, fatigue or fever  HEENT:  Negative for congestion, sore throat and tinnitus  No visual complaints  Respiratory:  Negative cough, chest tightness, shortness of breath and wheezing  Cardiovascular:  Negative for chest pain and palpitations  Underlying history of chronic atrial fibrillation  Gastrointestinal: Negative for abdominal distention or pain, constipation, diarrhea and nausea  Endocrine: Negative for cold intolerance, heat intolerance, polydipsia and polyuria  Genitourinary:                Negative for  dysuria, flank pain  Tolerating Durán without difficulty  Skin:   Negative for color change, pallor and rash  Neurological:   Negative for dizziness, tremors, seizures, syncope, facial asymmetry, speech difficulty, weakness, light-headedness, numbness and headaches  Hematological:  Musculoskeletal:  Psychiatric:  No h/o spontaneous bruising/bleeding  Chronically anticoagulated with Eliquis  Joint pains as above  Negative for agitation, behavioral problems, confusion, decreased concentration, dysphoric mood and sleep disturbance  A complete system-based review of systems as discussed with patient is otherwise negative  PAST MEDICAL HISTORY:  Past Medical History:   Diagnosis Date    Arthritis     [facility] [address] [facility]     Cancer ([facility] [address] )     prostate    Dental crowns present     History of pleurisy 1974    History of pneumonia     several years ago   Vincent Moody History of prostate cancer     History of radiation therapy     final treatment May 2017    [place] (hard of hearing)     bilat w hearing aids    Hyperlipidemia     Left thigh pain     Pneumonia     Right hip pain     Unsteady gait     Wears glasses      Past Surgical History:   Procedure Laterality Date    CARPAL TUNNEL RELEASE Bilateral     COLONOSCOPY      FOOT SURGERY Right     HEMORRHOID SURGERY         FAMILY HISTORY:  Non-contributory    SOCIAL HISTORY:  History   Alcohol Use    Yes     Comment: wine daily with a meal or so     History   Drug Use No     History   Smoking Status    Former Smoker    Types: Pipe    Quit date: 12/31/1986   Smokeless Tobacco    Never Used       ALLERGIES:  No Known Allergies    MEDICATIONS:  All current active medications have been reviewed    Current Facility-Administered Medications   Medication Dose Route Frequency Provider Last Rate Last Dose    acetaminophen (TYLENOL) tablet 650 mg  650 mg Oral Q6H PRN Zachary Spatz, PA-C        apixaban Yovani Brian) tablet 2 5 mg  2 5 mg Oral BID Singh Roque PA-C        atorvastatin (LIPITOR) tablet 20 mg  20 mg Oral Once per day on Mon Wed Fri Singh Roque PA-C   20 mg at 11/29/17 1528    calcium carbonate (TUMS) chewable tablet 1,000 mg  1,000 mg Oral Daily PRN Zachary Spatz, PA-C        ceFAZolin (ANCEF) IVPB (premix) 1,000 mg  1,000 mg Intravenous Q8H Robert Ramirez PA-C   Stopped at 11/29/17 1558    celecoxib (CeleBREX) capsule 200 mg  200 mg Oral Daily Singh Roque PA-C        dexamethasone (DECADRON) injection 4 mg  4 mg Intravenous Once PRN Delphy Defalcis, DO        diltiazem (TIAZAC) 24 hr capsule 300 mg  300 mg Oral Daily Makenzie Wang MD        diphenhydrAMINE (BENADRYL) injection 25 mg  25 mg Intravenous Q6H PRN Delphy Defalcis, DO        [START ON 11/30/2017] HYDROmorphone (DILAUDID) 1 mg/mL injection 0 2 mg  0 2 mg Intravenous Q3H PRN Singh Roque PA-C        HYDROmorphone (DILAUDID) 1 mg/mL injection 0 5 mg  0 5 mg Intravenous Q1H PRN Delphy Defalcis, DO        ketorolac (TORADOL) 30 mg/mL injection 15 mg  15 mg Intravenous Q6H PRN Singh Roque PA-C        lactated ringers infusion  125 mL/hr Intravenous Continuous Robert Ramirez PA-C 125 mL/hr at 11/29/17 1747 125 mL/hr at 11/29/17 1747    methocarbamol (ROBAXIN) tablet 500 mg  500 mg Oral Q6H PRN Robert Ramirez PA-C        metoclopramide (REGLAN) injection 5 mg  5 mg Intravenous Q6H PRN Delphy Defalcis, DO        multivitamin-minerals (CENTRUM) tablet 1 tablet  1 tablet Oral Daily Singh Roque PA-C        nalbuphine (NUBAIN) injection 5 mg  5 mg Intravenous Q3H PRN Delphy Defalcis, DO        [START ON 11/30/2017] ondansetron (ZOFRAN) injection 4 mg  4 mg Intravenous Q6H PRN Singh Roque PA-C        [facility]etron [facility] injection 4 mg  4 mg Intravenous Q4H PRN Delphy Defalcis, DO        [START ON 11/30/2017] oxyCODONE (ROXICODONE) immediate release tablet 10 mg  10 mg Oral Q4H PRN Singh Roque PA-C        [START ON 11/30/2017] oxyCODONE (ROXICODONE) IR tablet 5 mg  5 mg Oral Q4H PRN Singh Roque PA-C        pantoprazole (PROTONIX) EC tablet 40 mg  40 mg Oral Early Morning Singh Roque PA-C        senna (SENOKOT) tablet 8 6 mg  1 tablet Oral Daily Singh Roque PA-C        sodium chloride 0 9 % infusion  125 mL/hr Intravenous Continuous Kam Hill DO   Stopped at 17 0935    [START ON 2017] traMADol (ULTRAM) tablet 50 mg  50 mg Oral Q6H PRN Taylor Barthel, PA-C           Prescriptions Prior to Admission   Medication    apixaban (ELIQUIS) 5 mg    atorvastatin (LIPITOR) 20 mg tablet    diltiazem (TIAZAC) 300 MG 24 hr capsule    Leuprolide Acetate (LUPRON SC)    Multiple Vitamins-Minerals (MULTIVITAMIN WITH MINERALS) tablet    Omega-3 Fatty Acids (FISH OIL PO)    TRAMADOL HCL PO           PHYSICAL EXAM:    Vitals:  HR:  [] 101  Resp:  [13-18] 16  BP: ()/(51-81) 116/62  SpO2:  [96 %-100 %] 97 %  Temp (24hrs), Av 5 °F (36 4 °C), Min:96 9 °F (36 1 °C), Max:98 8 °F (37 1 °C)  Current: Temperature: 98 8 °F (37 1 °C)  Body mass index is 26 18 kg/m²  General Appearance:    Patient watching television, not in any apparent distress, very pleasant and cooperative, does not appear to be in any distress  No pallor is noted  Awake, alert, cooperative  Head:    Normocephalic, without obvious abnormality, atraumatic   Eyes:    Pupils equal in size,conjunctiva/corneas clear, EOM's intact  Right upper eyelid on the lateral aspect has a superficial 1-2 mm area of redness  No associated discharge or drainage  Ears:    External ear canals, both ears no drainage or redness  Nose:   No evidence of epistaxis/ discharge from nares  Throat:   Lips, mucosa, and tongue normal    Neck:   Supple, symmetrical, trachea midline, no JVP  Back:     Symmetric, no curvature, no CVA tenderness   Lungs:     Bilateral air entry is broncho-alveolar and equal        No crepitation or rales  No pleural rub  Heart:    Irregular rhythm, S1S2 normal, no murmur, rub  Abdomen:     Soft, non-tender, no masses, no organomegaly   Genitalia:    Indwelling Durán catheter  Clear urine +, No hematuria  Musculoskeletal:   Extremities appear normal, atraumatic, no cyanosis or   edema   Surgical site on the operated right hip has clear dressing / no bleeding or   hemorrhage apparent  Vascular:   2+ in Posterior tibialis / dorsalis pedis  Skin:   Skin color, texture, turgor normal, no rashes or lesions   Neurologic:   Oriented/ Awake/ facial symmetry maintained  Speech is intact  Muscle bulk and strength is equivocal in B/L Upper and lower extremities except on operated right lower limb  Light sensation is intact B/l LE  B/L [place] flexion is WNL  LABS, IMAGING, & OTHER STUDIES:  Lab Results:  I have personally reviewed pertinent labs  Lab Results   Component Value Date     10/31/2017     10/31/2017    CO2 31 10/31/2017    ANIONGAP 6 10/31/2017    BUN 11 10/31/2017    CREATININE 0 84 10/31/2017    EGFR 85 10/31/2017    GLUCOSE 92 10/31/2017    CALCIUM 9 5 10/31/2017    AST 17 10/31/2017    ALT 23 10/31/2017    ALKPHOS 100 10/31/2017    PROT 7 5 10/31/2017    ALBUMIN 3 8 10/31/2017    BILITOT 0 43 10/31/2017     Lab Results   Component Value Date    WBC 5 95 10/31/2017    HGB 12 4 10/31/2017     10/31/2017       Lab Results   Component Value Date    INR 1 07 11/29/2017    INR 1 25 (H) 10/31/2017         Imaging Studies:   I have personally reviewed pertinent imaging study reports  Imaging:     Xr Chest Pa & Lateral    Result Date: 11/1/2017  Narrative:   CHEST - DUAL ENERGY INDICATION:  Preop  Primary osteoarthritis of the right hip  COMPARISON:  None   VIEWS:  PA (including soft tissue/bone algorithms) and lateral projections   IMAGES:  4   FINDINGS:     Cardiomediastinal silhouette appears unremarkable  The lungs are clear  No pneumothorax or pleural effusion  Visualized osseous structures appear within normal limits for the patient's age  Impression: No active pulmonary disease  Workstation performed: [id number]     Xr Hip/pelv 1 Vw Right     Result Date: 11/29/2017  Narrative: Right hip INDICATION: Postop right hip surgery   COMPARISON: 11/29/2017 VIEWS: AP portable view; 1 image FINDINGS: Demonstrated is a right total hip arthroplasty which appears in satisfactory position  Anticipated post surgical changes are seen in the adjacent soft tissues  Impression: Status post right total hip arthroplasty  Workstation performed: [id number]     Xr Hip/pelv 1 Vw Right If Performed    Result Date: 11/29/2017  Narrative: C-ARM - right hip INDICATION: Primary osteoarthritis right hip  Fluoroscopic guidance for ORIF  COMPARISON:  None TECHNIQUE: FLUOROSCOPY TIME:   12 seconds 3 FLUOROSCOPIC IMAGES FINDINGS: Fluoroscopic assistance is used in the operating room for the placement of right hip arthroplasty  Delayed images demonstrate right hip arthroplasty in satisfactory position  Osseous and soft tissue detail limited by technique  Impression: Fluoroscopic guidance for orthopedic intervention  Please refer to the separate procedure notes for additional details  Workstation performed: [id number]       EKG, Pathology, and Other Studies:   I have personally reviewed pertinent reports  Ref Range & Units 10/31/17 0848   Ventricular Rate BPM 68    Atrial Rate BPM 79    QRSD Interval ms 78    QT Interval ms 406    QTC Interval ms 431    QRS Axis degrees 15    T Wave Axis degrees 12    Narrative     Atrial fibrillation  Poor anterior R wave progression is noted  Abnormal ECG  No previous ECGs available              Portions of the record may have been created with voice recognition software  Occasional wrong word or "sound a like" substitutions may have occurred due to the inherent limitations of voice recognition software  Read the chart carefully and recognize, using context, where substitutions have occurred

## (undated) DEVICE — BIPOLAR SEALER 23-113-1 AQM 2.3: Brand: AQUAMANTYS™

## (undated) DEVICE — DRAPE SHEET X-LG

## (undated) DEVICE — WET SKIN PREP TRAY: Brand: MEDLINE INDUSTRIES, INC.

## (undated) DEVICE — HOOD: Brand: FLYTE

## (undated) DEVICE — SYRINGE 30ML LL

## (undated) DEVICE — GLOVE INDICATOR PI UNDERGLOVE SZ 8.5 BLUE

## (undated) DEVICE — THE SIMPULSE SOLO SYSTEM WITH ULTREX RETRACTABLE SPLASH SHIELD TIP: Brand: SIMPULSE SOLO

## (undated) DEVICE — GLOVE SRG BIOGEL 7.5

## (undated) DEVICE — MAYO STAND COVER: Brand: CONVERTORS

## (undated) DEVICE — SCD SEQUENTIAL COMPRESSION COMFORT SLEEVE MEDIUM KNEE LENGTH: Brand: KENDALL SCD

## (undated) DEVICE — VEST SURGEON DISPOSABLE

## (undated) DEVICE — PENCIL ELECTROSURG E-Z CLEAN -0035H

## (undated) DEVICE — 3000CC GUARDIAN II: Brand: GUARDIAN

## (undated) DEVICE — GLOVE SRG BIOGEL ORTHOPEDIC 7.5

## (undated) DEVICE — SURGICAL GOWN, XL SMARTSLEEVE: Brand: CONVERTORS

## (undated) DEVICE — INTENDED FOR TISSUE SEPARATION, AND OTHER PROCEDURES THAT REQUIRE A SHARP SURGICAL BLADE TO PUNCTURE OR CUT.: Brand: BARD-PARKER SAFETY BLADES SIZE 10, STERILE

## (undated) DEVICE — INTENDED FOR TISSUE SEPARATION, AND OTHER PROCEDURES THAT REQUIRE A SHARP SURGICAL BLADE TO PUNCTURE OR CUT.: Brand: BARD-PARKER ® CARBON RIB-BACK BLADES

## (undated) DEVICE — ELECTRODE ELECTROSURGICAL EDGE PTFE STANDARD L6.5 IN OD3/32 TEFLON COATED

## (undated) DEVICE — BIOLOX DELTA CERAMIC FEMORAL HEAD +5.0 36MM DIA 12/14 TAPER
Type: IMPLANTABLE DEVICE | Site: HIP | Status: NON-FUNCTIONAL
Brand: BIOLOX DELTA

## (undated) DEVICE — 3M™ TEGADERM™ TRANSPARENT FILM DRESSING FRAME STYLE, 1627, 4 IN X 10 IN (10 CM X 25 CM), 20/CT 4CT/CASE: Brand: 3M™ TEGADERM™

## (undated) DEVICE — DRAPE C-ARM X-RAY

## (undated) DEVICE — CAPIT HIP MOP -METAL ON POLY

## (undated) DEVICE — SKIN MARKER DUAL TIP WITH RULER CAP, FLEXIBLE RULER AND LABELS: Brand: DEVON

## (undated) DEVICE — HEX-LOCKING BLADE ELECTRODE: Brand: EDGE

## (undated) DEVICE — SAW BLADE OSCILLATING BRAZOL 167

## (undated) DEVICE — 10FR FRAZIER SUCTION HANDLE: Brand: CARDINAL HEALTH

## (undated) DEVICE — ADHESIVE SKIN HIGH VISCOSITY EXOFIN 1ML

## (undated) DEVICE — PREP SURGICAL PURPREP 26ML

## (undated) DEVICE — POSITIONER HANA TABLE PACK

## (undated) DEVICE — INSTRUMENT POUCH: Brand: CONVERTORS

## (undated) DEVICE — CHLORAPREP HI-LITE 26ML ORANGE

## (undated) DEVICE — SILVER-COATED ANTIMICROBIAL BARRIER DRESSING: Brand: ACTICOAT FLEX7 4" X 5"

## (undated) DEVICE — 3M™ IOBAN™ 2 ANTIMICROBIAL INCISE DRAPE 6640EZ: Brand: IOBAN™ 2

## (undated) DEVICE — HEAVY DUTY TABLE COVER: Brand: CONVERTORS

## (undated) DEVICE — REM POLYHESIVE ADULT PATIENT RETURN ELECTRODE: Brand: VALLEYLAB

## (undated) DEVICE — WEBRIL 6 IN UNSTERILE

## (undated) DEVICE — CAPIT HIP COP -CERAMIC ON POLY

## (undated) DEVICE — ANTIBACTERIAL UNDYED BRAIDED (POLYGLACTIN 910), SYNTHETIC ABSORBABLE SUTURE: Brand: COATED VICRYL

## (undated) DEVICE — GLOVE INDICATOR PI UNDERGLOVE SZ 8 BLUE

## (undated) DEVICE — VIOLET BRAIDED (POLYGLACTIN 910), SYNTHETIC ABSORBABLE SUTURE: Brand: COATED VICRYL

## (undated) DEVICE — TIBURON SPLIT SHEET: Brand: CONVERTORS

## (undated) DEVICE — GLOVE SRG BIOGEL 8

## (undated) DEVICE — PROXIMATE SKIN STAPLERS (35 WIDE) CONTAINS 35 STAINLESS STEEL STAPLES (FIXED HEAD): Brand: PROXIMATE

## (undated) DEVICE — ARTHROSCOPY FLOOR MAT

## (undated) DEVICE — NEEDLE 18 G X 1 1/2

## (undated) DEVICE — PACK UNIVERSAL DRAPES SUB-Q ICD

## (undated) DEVICE — GAUZE SPONGES,USP TYPE VII GAUZE, 12 PLY: Brand: CURITY

## (undated) DEVICE — GLOVE SRG BIOGEL 8.5

## (undated) DEVICE — TRAY FOLEY 16FR URIMETER SURESTEP

## (undated) DEVICE — ELECTRODE BLADE E-Z CLEAN 6.5IN -0014

## (undated) DEVICE — GAUZE SPONGES,16 PLY: Brand: CURITY

## (undated) DEVICE — GAMMEX® NON-LATEX SENSITIVE SIZE 7.5, STERILE NEOPRENE POWDER-FREE SURGICAL GLOVE: Brand: GAMMEX

## (undated) DEVICE — COBAN 6 IN STERILE

## (undated) DEVICE — SUT VICRYL 2-0 CT-1 36 IN J945H

## (undated) DEVICE — TOWEL SURG XR DETECT GREEN STRL RFD

## (undated) DEVICE — SUT VICRYL PLUS 1 CTX 36 IN VCP371H

## (undated) DEVICE — ADHESIVE SKN CLSR HISTOACRYL FLEX 0.5ML LF

## (undated) DEVICE — STRL ALLENTOWN HIP SHOULDER PK: Brand: CARDINAL HEALTH

## (undated) DEVICE — 4.0 MM X 2.35 MM X 70.0 MM OVAL CARBIDE BUR, 8 FLUTE